# Patient Record
Sex: FEMALE | Race: WHITE
[De-identification: names, ages, dates, MRNs, and addresses within clinical notes are randomized per-mention and may not be internally consistent; named-entity substitution may affect disease eponyms.]

---

## 2019-06-16 ENCOUNTER — HOSPITAL ENCOUNTER (INPATIENT)
Dept: HOSPITAL 75 - ER | Age: 74
LOS: 3 days | Discharge: HOME | DRG: 175 | End: 2019-06-19
Attending: INTERNAL MEDICINE | Admitting: INTERNAL MEDICINE
Payer: MEDICARE

## 2019-06-16 VITALS — WEIGHT: 198 LBS | HEIGHT: 66 IN | BODY MASS INDEX: 31.82 KG/M2

## 2019-06-16 VITALS — DIASTOLIC BLOOD PRESSURE: 60 MMHG | SYSTOLIC BLOOD PRESSURE: 103 MMHG

## 2019-06-16 VITALS — DIASTOLIC BLOOD PRESSURE: 87 MMHG | SYSTOLIC BLOOD PRESSURE: 125 MMHG

## 2019-06-16 VITALS — SYSTOLIC BLOOD PRESSURE: 106 MMHG | DIASTOLIC BLOOD PRESSURE: 78 MMHG

## 2019-06-16 VITALS — SYSTOLIC BLOOD PRESSURE: 115 MMHG | DIASTOLIC BLOOD PRESSURE: 61 MMHG

## 2019-06-16 VITALS — SYSTOLIC BLOOD PRESSURE: 108 MMHG | DIASTOLIC BLOOD PRESSURE: 88 MMHG

## 2019-06-16 DIAGNOSIS — R42: ICD-10-CM

## 2019-06-16 DIAGNOSIS — G43.909: ICD-10-CM

## 2019-06-16 DIAGNOSIS — R11.0: ICD-10-CM

## 2019-06-16 DIAGNOSIS — M54.9: ICD-10-CM

## 2019-06-16 DIAGNOSIS — I21.A1: ICD-10-CM

## 2019-06-16 DIAGNOSIS — R63.0: ICD-10-CM
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
## 2019-06-16 DIAGNOSIS — E66.9: ICD-10-CM

## 2019-06-16 DIAGNOSIS — R26.81: ICD-10-CM

## 2019-06-16 DIAGNOSIS — K21.9: ICD-10-CM

## 2019-06-16 DIAGNOSIS — I10: ICD-10-CM

## 2019-06-16 DIAGNOSIS — H53.2: ICD-10-CM

## 2019-06-16 DIAGNOSIS — A94: ICD-10-CM

## 2019-06-16 DIAGNOSIS — E87.6: ICD-10-CM

## 2019-06-16 DIAGNOSIS — Z91.81: ICD-10-CM

## 2019-06-16 DIAGNOSIS — M19.91: ICD-10-CM

## 2019-06-16 DIAGNOSIS — D61.818: ICD-10-CM

## 2019-06-16 DIAGNOSIS — T50.2X5A: ICD-10-CM

## 2019-06-16 DIAGNOSIS — R79.89: ICD-10-CM

## 2019-06-16 DIAGNOSIS — E83.42: ICD-10-CM

## 2019-06-16 DIAGNOSIS — I26.99: Primary | ICD-10-CM

## 2019-06-16 DIAGNOSIS — Z86.718: ICD-10-CM

## 2019-06-16 LAB
ALBUMIN SERPL-MCNC: 3.8 GM/DL (ref 3.2–4.5)
ALP SERPL-CCNC: 195 U/L (ref 40–136)
ALT SERPL-CCNC: 154 U/L (ref 0–55)
APTT BLD: 35 SEC (ref 24–35)
APTT PPP: YELLOW S
BACTERIA #/AREA URNS HPF: (no result) /HPF
BASOPHILS # BLD AUTO: 0 10^3/UL (ref 0–0.1)
BASOPHILS NFR BLD AUTO: 0 % (ref 0–10)
BILIRUB SERPL-MCNC: 1.9 MG/DL (ref 0.1–1)
BILIRUB UR QL STRIP: NEGATIVE
BUN/CREAT SERPL: 19
CALCIUM SERPL-MCNC: 8.9 MG/DL (ref 8.5–10.1)
CHLORIDE SERPL-SCNC: 97 MMOL/L (ref 98–107)
CO2 SERPL-SCNC: 22 MMOL/L (ref 21–32)
CREAT SERPL-MCNC: 0.78 MG/DL (ref 0.6–1.3)
D DIMER PPP FEU-MCNC: 15.84 UG/ML (ref 0–0.49)
EOSINOPHIL # BLD AUTO: 0 10^3/UL (ref 0–0.3)
EOSINOPHIL NFR BLD AUTO: 0 % (ref 0–10)
ERYTHROCYTE [DISTWIDTH] IN BLOOD BY AUTOMATED COUNT: 13.1 % (ref 10–14.5)
FIBRINOGEN PPP-MCNC: CLEAR MG/DL
GFR SERPLBLD BASED ON 1.73 SQ M-ARVRAT: > 60 ML/MIN
GLUCOSE SERPL-MCNC: 127 MG/DL (ref 70–105)
GLUCOSE UR STRIP-MCNC: NEGATIVE MG/DL
HCT VFR BLD CALC: 35 % (ref 35–52)
HGB BLD-MCNC: 12.1 G/DL (ref 11.5–16)
INR PPP: 1.1 (ref 0.8–1.4)
KETONES UR QL STRIP: (no result)
LEUKOCYTE ESTERASE UR QL STRIP: NEGATIVE
LYMPHOCYTES # BLD AUTO: 0.2 X 10^3 (ref 1–4)
LYMPHOCYTES NFR BLD AUTO: 9 % (ref 12–44)
MANUAL DIFFERENTIAL PERFORMED BLD QL: YES
MCH RBC QN AUTO: 30 PG (ref 25–34)
MCHC RBC AUTO-ENTMCNC: 35 G/DL (ref 32–36)
MCV RBC AUTO: 88 FL (ref 80–99)
MONOCYTES # BLD AUTO: 0.1 X 10^3 (ref 0–1)
MONOCYTES NFR BLD AUTO: 4 % (ref 0–12)
MONOCYTES NFR BLD: 5 %
NEUTROPHILS # BLD AUTO: 2 X 10^3 (ref 1.8–7.8)
NEUTROPHILS NFR BLD AUTO: 87 % (ref 42–75)
NEUTS BAND NFR BLD MANUAL: 88 %
NITRITE UR QL STRIP: NEGATIVE
PH UR STRIP: 7 [PH] (ref 5–9)
PLATELET # BLD: 120 10^3/UL (ref 130–400)
PMV BLD AUTO: 10.3 FL (ref 7.4–10.4)
POTASSIUM SERPL-SCNC: 3.1 MMOL/L (ref 3.6–5)
PROT SERPL-MCNC: 6.9 GM/DL (ref 6.4–8.2)
PROT UR QL STRIP: NEGATIVE
PROTHROMBIN TIME: 14.3 SEC (ref 12.2–14.7)
RBC #/AREA URNS HPF: (no result) /HPF
RBC MORPH BLD: NORMAL
SODIUM SERPL-SCNC: 133 MMOL/L (ref 135–145)
SP GR UR STRIP: 1 (ref 1.02–1.02)
SQUAMOUS #/AREA URNS HPF: (no result) /HPF
UROBILINOGEN UR-MCNC: NORMAL MG/DL
VARIANT LYMPHS NFR BLD MANUAL: 7 %
WBC # BLD AUTO: 2.3 10^3/UL (ref 4.3–11)
WBC #/AREA URNS HPF: (no result) /HPF

## 2019-06-16 PROCEDURE — 85379 FIBRIN DEGRADATION QUANT: CPT

## 2019-06-16 PROCEDURE — 85007 BL SMEAR W/DIFF WBC COUNT: CPT

## 2019-06-16 PROCEDURE — 71275 CT ANGIOGRAPHY CHEST: CPT

## 2019-06-16 PROCEDURE — 71045 X-RAY EXAM CHEST 1 VIEW: CPT

## 2019-06-16 PROCEDURE — 86757 RICKETTSIA ANTIBODY: CPT

## 2019-06-16 PROCEDURE — 86308 HETEROPHILE ANTIBODY SCREEN: CPT

## 2019-06-16 PROCEDURE — 93005 ELECTROCARDIOGRAM TRACING: CPT

## 2019-06-16 PROCEDURE — 86663 EPSTEIN-BARR ANTIBODY: CPT

## 2019-06-16 PROCEDURE — 86022 PLATELET ANTIBODIES: CPT

## 2019-06-16 PROCEDURE — 85730 THROMBOPLASTIN TIME PARTIAL: CPT

## 2019-06-16 PROCEDURE — 94761 N-INVAS EAR/PLS OXIMETRY MLT: CPT

## 2019-06-16 PROCEDURE — 86618 LYME DISEASE ANTIBODY: CPT

## 2019-06-16 PROCEDURE — 80048 BASIC METABOLIC PNL TOTAL CA: CPT

## 2019-06-16 PROCEDURE — 85027 COMPLETE CBC AUTOMATED: CPT

## 2019-06-16 PROCEDURE — 94664 DEMO&/EVAL PT USE INHALER: CPT

## 2019-06-16 PROCEDURE — 87040 BLOOD CULTURE FOR BACTERIA: CPT

## 2019-06-16 PROCEDURE — 36415 COLL VENOUS BLD VENIPUNCTURE: CPT

## 2019-06-16 PROCEDURE — 80053 COMPREHEN METABOLIC PANEL: CPT

## 2019-06-16 PROCEDURE — 96361 HYDRATE IV INFUSION ADD-ON: CPT

## 2019-06-16 PROCEDURE — 85025 COMPLETE CBC W/AUTO DIFF WBC: CPT

## 2019-06-16 PROCEDURE — 86668 FRANCISELLA TULARENSIS: CPT

## 2019-06-16 PROCEDURE — 85384 FIBRINOGEN ACTIVITY: CPT

## 2019-06-16 PROCEDURE — 86788 WEST NILE VIRUS AB IGM: CPT

## 2019-06-16 PROCEDURE — 84100 ASSAY OF PHOSPHORUS: CPT

## 2019-06-16 PROCEDURE — 93306 TTE W/DOPPLER COMPLETE: CPT

## 2019-06-16 PROCEDURE — 86664 EPSTEIN-BARR NUCLEAR ANTIGEN: CPT

## 2019-06-16 PROCEDURE — 93041 RHYTHM ECG TRACING: CPT

## 2019-06-16 PROCEDURE — 83605 ASSAY OF LACTIC ACID: CPT

## 2019-06-16 PROCEDURE — 96365 THER/PROPH/DIAG IV INF INIT: CPT

## 2019-06-16 PROCEDURE — 93970 EXTREMITY STUDY: CPT

## 2019-06-16 PROCEDURE — 86666 EHRLICHIA ANTIBODY: CPT

## 2019-06-16 PROCEDURE — 85610 PROTHROMBIN TIME: CPT

## 2019-06-16 PROCEDURE — 83735 ASSAY OF MAGNESIUM: CPT

## 2019-06-16 PROCEDURE — 80061 LIPID PANEL: CPT

## 2019-06-16 PROCEDURE — 86665 EPSTEIN-BARR CAPSID VCA: CPT

## 2019-06-16 PROCEDURE — 82962 GLUCOSE BLOOD TEST: CPT

## 2019-06-16 PROCEDURE — 85045 AUTOMATED RETICULOCYTE COUNT: CPT

## 2019-06-16 PROCEDURE — 80074 ACUTE HEPATITIS PANEL: CPT

## 2019-06-16 PROCEDURE — 81000 URINALYSIS NONAUTO W/SCOPE: CPT

## 2019-06-16 PROCEDURE — 70450 CT HEAD/BRAIN W/O DYE: CPT

## 2019-06-16 PROCEDURE — 86789 WEST NILE VIRUS ANTIBODY: CPT

## 2019-06-16 PROCEDURE — 84484 ASSAY OF TROPONIN QUANT: CPT

## 2019-06-16 RX ADMIN — ONDANSETRON PRN MG: 2 INJECTION, SOLUTION INTRAMUSCULAR; INTRAVENOUS at 18:02

## 2019-06-16 RX ADMIN — SODIUM CHLORIDE SCH MLS/HR: 900 INJECTION, SOLUTION INTRAVENOUS at 14:36

## 2019-06-16 RX ADMIN — SODIUM CHLORIDE SCH MLS/HR: 900 INJECTION, SOLUTION INTRAVENOUS at 17:28

## 2019-06-16 RX ADMIN — DOXYCYCLINE HYCLATE SCH MG: 100 TABLET, COATED ORAL at 15:42

## 2019-06-16 RX ADMIN — APIXABAN SCH MG: 5 TABLET, FILM COATED ORAL at 15:42

## 2019-06-16 NOTE — NUR
JAMINDUNG HUBBARD admitted to room CU3-1, with an admitting diagnosis of TICK BITE DISEASE, on 
06/16/19 from ER via W/C, accompanied by STAFF.JAMIN HUBBARD introduced to surroundings, 
call light, bed controls, phone, TV, temperature control, lights, meal times, smoking 
policy, visitor policy, side rail policy, bathrooms and showers.  Patient Rights given to 
patient in the handbook.JAMIN HUBBARD verbalizes understanding that [facility] is not 
responsible for the loss or damage to any personal effects or valuables that are kept in the 
patients posession during their hospitalization.  The following Patient Care Plans were 
discussed with the PT AND : Discharge Planning, PAIN CONTROL,IV THERAPY, and TESTS 
AND PROCEDURES. JAMIN HUBBARD verbalizes understanding of Interdisciplinary Patient 
Education. Patient and/or family were informed about the Rapid Response Team and its 
purpose.

## 2019-06-16 NOTE — NUR
PT ASSISTED BACK TO BED AT THIS TIME. PT REPORTS EPISODE OF DIAHRREA AND 
CONTAMINATED URINE SPECIMEN IS SUSPECTED.

## 2019-06-16 NOTE — XMS REPORT
Continuity of Care Document

                             Created on: 2019



Sonja Huston

External Reference #: [id number]

: 1945

Sex: Female



Demographics







                          Address                   [address] [place] [address]  [id number]

 

                          Home Phone                [phone] x

 

                          Preferred Language        Unknown

 

                          Marital Status            Unknown

 

                          Oriental orthodox Affiliation     Unknown

 

                          Race                      Unknown

 

                          Ethnic Group              Unknown





Author







                          Organization              Unknown

 

                          Address                   Unknown

 

                          Phone                     [phone]



              



Allergies

      





             Active              Description              Code              Type              Severity

                Reaction              Onset              Reported/Identified              Relationship

 to Patient                             Clinical Status        

 

                Yes              NKANo Known Allergies              NKA              Miscellaneous Allergy

              Mild              N/A                             2009                 

                                                 



                  



Medications

      



There is no data.                  



Problems

      





             Date Dx Coded              Attending              Type              Code              Diagnosis

                                        Diagnosed By        

 

                2015              ELINA GRAY, DIANELYS MANNING              Ot              562.10      

                                                             

 

                2015              ELINA GRAY, DIANELYS MANNING              Ot              V12.72      

                                                             

 

                2015              ELINA GRAY, DIANELYS MANNING              Ot              V67.09      

                                                             

 

                2015              ELINA GRAY, DIANELYS MANNING              Ot              V72.84      

                                                             

 

                2015              ELINA GRAY, DIANELYS MANNING              Ot              V72.84      

                                                             

 

                2015              ELINA GRAY, DIANELYS MANNING              Ot              V72.84      

                                                             



                            



Procedures

      



There is no data.                  



Results

      





                    Test                Result              Range        









                                        Complete blood count (CBC) with automated white blood cell (WBC) differential - 

19 07:17         









                          Blood leukocytes automated count (number/volume)              2.3 10*3/uL         

                                        4.3-11.0        

 

                          Blood erythrocytes automated count (number/volume)              3.99 10*6/uL      

                                        4.35-5.85        

 

                          Venous blood hemoglobin measurement (mass/volume)              12.1 g/dL          

                                        11.5-16.0        

 

                    Blood hematocrit (volume fraction)              35 %                35-52        

 

                    Automated erythrocyte mean corpuscular volume              88 [foz_us]              

80-99        

 

                                        Automated erythrocyte mean corpuscular hemoglobin (mass per erythrocyte)        

                          30 pg                     25-34        

 

                                        Automated erythrocyte mean corpuscular hemoglobin concentration measurement (mass/volume)

                          35 g/dL                   32-36        

 

                    Automated erythrocyte distribution width ratio              13.1 %              10.0-

14.5        

 

                    Automated blood platelet count (count/volume)              120 10*3/uL              

130-400        

 

                          Automated blood platelet mean volume measurement              10.3 [foz_us]       

                                        7.4-10.4        

 

                    Automated blood neutrophils/100 leukocytes              87 %                42-75     

   

 

                    Automated blood lymphocytes/100 leukocytes              9 %                 12-44      

  

 

                    Blood monocytes/100 leukocytes              4 %                 0-12        

 

                    Automated blood eosinophils/100 leukocytes              0 %                 0-10       

 

 

                    Automated blood basophils/100 leukocytes              0 %                 0-10        

 

                          Blood neutrophils automated count (number/volume)              2.0 10*3           

                                        1.8-7.8        

 

                          Blood lymphocytes automated count (number/volume)              0.2 10*3           

                                        1.0-4.0        

 

                    Blood monocytes automated count (number/volume)              0.1 10*3              0.0-

1.0        

 

                    Automated eosinophil count              0.0 10*3/uL              0.0-0.3        

 

                    Automated blood basophil count (count/volume)              0.0 10*3/uL              

0.0-0.1        









                                        PT panel in platelet poor plasma by coagulation assay - 19 07:17         









                          Prothrombin time (PT) in platelet poor plasma by coagulation assay              14.3

 s                                      12.2-14.7        

 

                          INR in platelet poor plasma or blood by coagulation assay              1.1        

                                        0.8-1.4        









                                        Activated partial thromboplastin time (aPTT) in platelet poor plasma bycoagulation

 assay - 19 07:17         









                                        Activated partial thromboplastin time (aPTT) in platelet poor plasma bycoagulation

 assay                    35 s                      24-35        









                                        Comprehensive metabolic panel - 19 07:17         









                          Serum or plasma sodium measurement (moles/volume)              133 mmol/L         

                                        135-145        

 

                          Serum or plasma potassium measurement (moles/volume)              3.1 mmol/L      

                                        3.6-5.0        

 

                          Serum or plasma chloride measurement (moles/volume)              97 mmol/L        

                                                

 

                    Carbon dioxide              22 mmol/L              21-32        

 

                          Serum or plasma anion gap determination (moles/volume)              14 mmol/L     

                                        5-14        

 

                          Serum or plasma urea nitrogen measurement (mass/volume)              15 mg/dL     

                                        7-18        

 

                          Serum or plasma creatinine measurement (mass/volume)              0.78 mg/dL      

                                        0.60-1.30        

 

                    Serum or plasma urea nitrogen/creatinine mass ratio              19                  NRG

        

 

                                        Serum or plasma creatinine measurement with calculation of estimated glomerular 

filtration rate              >                         NRG        

 

                          Serum or plasma glucose measurement (mass/volume)              127 mg/dL          

                                                

 

                          Serum or plasma calcium measurement (mass/volume)              8.9 mg/dL          

                                        8.5-10.1        

 

                          Serum or plasma total bilirubin measurement (mass/volume)              1.9 mg/dL  

                                        0.1-1.0        

 

                                        Serum or plasma alkaline phosphatase measurement (enzymatic activity/volume)    

                          195 U/L                           

 

                                        Serum or plasma aspartate aminotransferase measurement (enzymatic activity/volume)

                          190 U/L                   5-34        

 

                                        Serum or plasma alanine aminotransferase measurement (enzymatic activity/volume)

                          154 U/L                   0-55        

 

                          Serum or plasma protein measurement (mass/volume)              6.9 g/dL           

                                        6.4-8.2        

 

                          Serum or plasma albumin measurement (mass/volume)              3.8 g/dL           

                                        3.2-4.5        

 

                    CALCIUM CORRECTED              9.1 mg/dL              8.5-10.1        









                                        Serum or plasma troponin i.cardiac measurement (mass/volume) - 19 07:17   

      









                          Serum or plasma troponin i.cardiac measurement (mass/volume)              0.067 ng/mL

                                        <0.028        









                                        Manual absolute plasma cell count - 19 07:17         









                    Blood monocytes/100 leukocytes              5 %                 NRG        

 

                    Manual blood segmented neutrophils/100 leukocytes              88 %                NRG

        

 

                    Manual blood lymphocytes/100 leukocytes              7 %                 NRG        

 

                          Blood erythrocyte morphology finding identification              NORMAL           

                                        NRG        









                                        Fibrin D-dimer FEU measurement in platelet poor plasma (mass/volume) - 19 

07:17         









                          Fibrin D-dimer FEU measurement in platelet poor plasma (mass/volume)              

15.84 ug/mL                             0.00-0.49        









                                        Capillary blood glucose measurement by glucometer (mass/volume) - 19 07:32

         









                          Capillary blood glucose measurement by glucometer (mass/volume)              126 mg/dL

                                                









                                        Blood lactic acid measurement (moles/volume) - 19 07:37         









                    Blood lactic acid measurement (moles/volume)              0.69 mmol/L              0.50-

2.00        



                                



Encounters

      





                ACCT No.              Visit Date/Time              Discharge              Status      

                Pt. Type              Provider              Facility              Loc./Unit      

                                        Complaint        

 

                    7/29/10              2019 00:15:41              2019 23:59:59           

                [facility]              Outpatient              Pratima Ni                       

                                                             

 

                    [id number]              2015 06:51:00              2015 09:30:00      

                [facility]              Outpatient              DIANELYS ANTOINE MD              [facility]                                

 

                    [id number]              2015 05:54:00              2015 23:59:59      

                [facility]              Outpatient              DIANELYS ANTOINE MD              [facility]              PREOP                              

 

                    [id number]              07/10/2014 13:53:00              07/10/2014 15:31:00      

           [facility]              Emergency                                                           

         

 

                    [id number]              2014 17:31:00              2014 23:59:59      

             [facility]              Outpatient                                                        

                                                 

 

                [id number]              2019 07:39:00                                          

             Document Registration

## 2019-06-16 NOTE — HISTORY & PHYSICAL-HOSPITALIST
History of Present Illness


HPI/Chief Complaint


Chief complaint: Fever with hypoxia





History present illness: This is a patient of Dr. Ni who has a past medical

history of hypertension who presented to the ER with worsening weakness and 

shortness of breath.  Work-up included multiple test revealing leukopenia, 

thrombocytopenia, fever of 102 with elevated d-dimer so CT scan was obtained 

showing bilateral pulmonary emboli.  Cardiology and pulmonology were consulted 

patient was placed on oral anticoagulation and in the cardiac stepdown unit.  

Currently patient denies any chest pain shortness of breath and denies any other

significant complaints.  She had a tick bite that have been in the back of her 

scalp for at least 4 days before found in dog out by her .  She has been 

placed on empiric antibiotic of doxycycline in the meantime and tickborne titers

are pending.


Source:  patient, RN/MD


Exam Limitations:  no limitations


Date Seen


6/16/19


Time Seen by a Provider:  11:00


Attending Physician


Sydni Singer DO


PCP


Pratima Ni DO


Referring Physician





Date of Admission


Jun 16, 2019 at 08:40





Home Medications & Allergies


Home Medications


Reviewed patient Home Medication Reconciliation performed by pharmacy medication

reconciliations technician and/or nursing.


Patients Allergies have been reviewed.





Allergies





Allergies


Coded Allergies


  NKANo Known Allergies (Unverified Allergy, Mild, 6/1/09)








Past Medical-Social-Family Hx


Past Med/Social Hx:  Reviewed Nursing Past Med/Soc Hx, Reviewed and Corrections 

made


Patient Social History


Marrital Status:  


Employed/Student:  retired (homemaker)


Alcohol Use:  Denies Use


Recreational Drug Use:  No


Smoking Status:  Never a Smoker


Physical Abuse Screen:  No


Sexual Abuse:  No


Recent Foreign Travel:  No


Contact w/other who traveled:  No


Recent Infectious Disease Expo:  No





Immunizations Up To Date


Date of Pneumonia Vaccine:  Nov 1, 2017


Date of Influenza Vaccine:  Sep 13, 2014





Seasonal Allergies


Seasonal Allergies:  No





Past Medical History


Surgeries:  Orthopedic


Cardiac:  Deep Vein Thrombosis, Hypertension


Neurological:  Headaches /Migraines


Gastrointestinal:  Gastroesophageal Reflux


Musculoskeletal:  Arthritis, Chronic Back Pain


History of Blood Disorders:  No





Review of Systems


Constitutional:  see HPI, dizziness, fever, malaise, weakness


Respiratory:  dyspnea on exertion





Physical Exam


Physical Exam


Vital Signs





Vital Signs - First Documented








 6/16/19 6/16/19





 07:24 07:30


 


Temp 101.5 


 


Pulse 107 


 


Resp 20 


 


B/P (MAP) 116/95 (102) 


 


Pulse Ox 94 


 


O2 Delivery  Nasal Cannula


 


O2 Flow Rate  2.00





Capillary Refill : Less Than 3 Seconds


Height, Weight, BMI


Height: 5'6.00"


Weight: 198lbs. 0.0oz. [id number]; 32.0 BMI


Method:Stated


General Appearance:  No Apparent Distress, WD/WN, Chronically ill, Obese


Eyes:  Right Eye Normal Inspection, Right Eye PERRL


HEENT:  PERRL/EOMI, Normal ENT Inspection, Pharynx Normal, Moist Mucous 

Membranes


Neck:  Full Range of Motion, Normal Inspection, Non Tender


Respiratory:  Chest Non Tender, Lungs Clear, Normal Breath Sounds, No Accessory 

Muscle Use, No Respiratory Distress


Cardiovascular:  Regular Rate, Rhythm, No Edema, No Gallop, No JVD, No Murmur, 

Normal Peripheral Pulses


Gastrointestinal:  Normal Bowel Sounds, No Organomegaly, No Pulsatile Mass, Non 

Tender, Soft


Back:  Normal Inspection, No CVA Tenderness, No Vertebral Tenderness


Extremity:  Normal Capillary Refill, Normal Inspection, Normal Range of Motion, 

Non Tender, No Calf Tenderness, No Pedal Edema


Neurologic/Psychiatric:  Alert, Oriented x3, No Motor/Sensory Deficits, Normal 

Mood/Affect


Skin:  Normal Color, Warm/Dry


Lymphatic:  No Adenopathy





Results


Results/Procedures


Labs


Laboratory Tests


6/16/19 07:17








Patient resulted labs reviewed.





Assessment/Plan


Admission Diagnosis


Assessment:


Bilateral pulmonary embolism placed on Eliquis


Fever placed on abx empirically


Recent tick bite on the back of the neck placed on empiric Doxycycline and tick 

borne titers drawn


HTN


Leukopenia with thrombocytopenia suspicious for tick borne illness


Hypokalemia


Subtle increase in troponin


Elevated LFT suspect tick born illness





Plan:


OAC


Potassium


O2


Doxycycline empirically


Monitor liver enzymes


Admission Status:  Inpatient Order (span 2 midnights)


Reason for Inpatient Admission:  


Fever with bilateral PE's





Diagnosis/Problems


Diagnosis/Problems





(1) Pulmonary embolism, bilateral


Status:  Acute


(2) Liver enzyme elevation


Status:  Acute


(3) Leukopenia


Status:  Acute


Qualifiers:  


   Leukopenia type:  unspecified  Qualified Codes:  D72.819 - Decreased white 

blood cell count, unspecified


(4) Thrombocytopenia


Status:  Acute


(5) Hypokalemia


Status:  Acute


(6) Tick-borne disease


Status:  Acute


(7) Hypoxia


Status:  Acute


(8) Dizziness


Status:  Acute


(9) Elevated troponin


Status:  Acute


(10) D-dimer, elevated


Status:  Acute





Clinical Quality Measures


DVT/VTE Risk/Contraindication:


Risk Factor Score Per Nursing:  3


RFS Level Per Nursing on Admit:  3=High











SYDNI SINGER DO                Jun 16, 2019 15:13

## 2019-06-16 NOTE — ED NEUROLOGICAL PROBLEM
General


Chief Complaint:  Dizziness/Syncope


Stated Complaint:  DIZZY,FALLING,NOT EATING


Source:  patient, family


Exam Limitations:  no limitations





History of Present Illness


Date Seen by Provider:  Jun 16, 2019


Time Seen by Provider:  07:35


Initial Comments


This 74-year-old white female presents with a history of headache and dizziness.

 4 days ago late in the afternoon on Thursday the patient developed a severe 

headache.  Although the patient suffers from migraines this headache felt 

different to her.  3 days later on Saturday the patient became dizzy and had 

self-limited double vision.  The dizziness and unsteady gait have continued.  

This has caused the patient to fall twice without injuring herself.  There has 

been no lateralizing or localizing persistent neurologic complaint.





The patient has had persistent nausea and dry heaves.





The patient has had a recent tick bite to the cervical area.  The tick was 

removed.  The patient has developed no significant rash or localized significant

erythema to the tick bite area located at the back of her neck.





Patient has a history of hypertension.  She is on lisinopril and 

hydrochlorothiazide.  She has not taken her blood pressure medicine for several 

weeks due to a cough (ace inhibitor?).





Patient is under the care of Dr. BUCK.





Allergies and Home Medications


Allergies


Coded Allergies:  


     TARAANo Known Allergies (Unverified  Allergy, Mild, 6/1/09)





Home Medications


Gabapentin 300 Mg Capsule, 600 MG PO DAILY, (Reported)


[Celebrex]  , 200 MG PO DAILY, (Reported)





Patient Home Medication List


Home Medication List Reviewed:  Yes





Review of Systems


Review of Systems


Constitutional:  chills; No fever


Eyes:  See HPI; Denies Blindness; Other


Ears, Nose, Mouth, Throat:  denies ear pain, denies ear discharge


Respiratory:  cough (recent cough, questionably from ACE inhibitor.)


Cardiovascular:  No chest pain, No palpitations


Gastrointestinal:  No constipation, No diarrhea; loss of appetite, nausea, 

vomiting


Genitourinary:  No dysuria, No frequency


Pregnant:  No


Musculoskeletal:  No back pain


Skin:  No change in color, No rash; other (tick bite to the upper neck area 

posteriorly)


Psychiatric/Neurological:  Denies Cognitive Dysfunction; Headache


Endocrine:  No Symptoms Reported


Hematologic/Lymphatic:  No Symptoms Reported





Past Medical-Social-Family Hx


Past Med/Social Hx:  Reviewed Nursing Past Med/Soc Hx


Patient Social History


Recent Foreign Travel:  No


Contact w/Someone Who Travel:  No





Immunizations Up To Date


Date of Pneumonia Vaccine:  Jan 13, 2012


Date of Influenza Vaccine:  Sep 13, 2014





Past Medical History


Arthritis





Physical Exam


Vital Signs





Vital Signs - First Documented








 6/16/19 6/16/19





 07:24 07:30


 


Temp 101.5 


 


Pulse 107 


 


Resp 20 


 


B/P (MAP) 116/95 (102) 


 


Pulse Ox 94 


 


O2 Delivery  Nasal Cannula


 


O2 Flow Rate  2.00





Capillary Refill :


Height, Weight, BMI


Height: 5'6.00"


Weight: 190lbs. oz. 86.080686qc;  BMI


Method:


General Appearance:  WD/WN, no apparent distress


HEENT:  normal ENT inspection


Neck:  non-tender, full range of motion, supple


Respiratory:  chest non-tender, lungs clear, normal breath sounds


Cardiovascular:  normal peripheral pulses, regular rate, rhythm


Gastrointestinal:  normal bowel sounds, non tender, soft


Back:  normal inspection, no vertebral tenderness


Extremities:  normal range of motion, non-tender, normal inspection


Neurologic/Psychiatric:  no motor/sensory deficits, alert, normal mood/affect, 

oriented x 3


Crainal Nerves:  normal hearing, normal speech, PERRL; No facial asymmetry, No 

tongue deviation to R, No tongue deviation to L


Coordination/Gait:  normal gait


Skin:  normal color, warm/dry, other (there is a 1 cm area of slight erythema 

over the superior aspect of the cervical region where the tick was removed.)





Focused Exam


Lactate Level


6/16/19 07:37: Lactic Acid Level 0.69





Lactic Acid Level





Laboratory Tests








Test


 6/16/19


07:37


 


Lactic Acid Level


 0.69 MMOL/L


(0.50-2.00)











Progress/Results/Core Measures


Results/Orders


Lab Results





Laboratory Tests








Test


 6/16/19


07:17 6/16/19


07:29 6/16/19


07:32 6/16/19


07:37 Range/Units


 


 


White Blood Count


 2.3 L


 


 


 


 4.3-11.0


10^3/uL


 


Red Blood Count


 3.99 L


 


 


 


 4.35-5.85


10^6/uL


 


Hemoglobin 12.1     11.5-16.0  G/DL


 


Hematocrit 35     35-52  %


 


Mean Corpuscular Volume 88     80-99  FL


 


Mean Corpuscular Hemoglobin 30     25-34  PG


 


Mean Corpuscular Hemoglobin


Concent 35 


 


 


 


 32-36  G/DL





 


Red Cell Distribution Width 13.1     10.0-14.5  %


 


Platelet Count


 120 L


 


 


 


 130-400


10^3/uL


 


Mean Platelet Volume 10.3     7.4-10.4  FL


 


Neutrophils (%) (Auto) 87 H    42-75  %


 


Lymphocytes (%) (Auto) 9 L    12-44  %


 


Monocytes (%) (Auto) 4     0-12  %


 


Eosinophils (%) (Auto) 0     0-10  %


 


Basophils (%) (Auto) 0     0-10  %


 


Neutrophils # (Auto) 2.0     1.8-7.8  X 10^3


 


Lymphocytes # (Auto) 0.2 L    1.0-4.0  X 10^3


 


Monocytes # (Auto) 0.1     0.0-1.0  X 10^3


 


Eosinophils # (Auto)


 0.0 


 


 


 


 0.0-0.3


10^3/uL


 


Basophils # (Auto)


 0.0 


 


 


 


 0.0-0.1


10^3/uL


 


Neutrophils % (Manual) 88      %


 


Lymphocytes % (Manual) 7      %


 


Monocytes % (Manual) 5      %


 


Blood Morphology Comment NORMAL      


 


Prothrombin Time 14.3     12.2-14.7  SEC


 


INR Comment 1.1     0.8-1.4  


 


Activated Partial


Thromboplast Time 35 


 


 


 


 24-35  SEC





 


D-Dimer


 15.84 H


 


 


 


 0.00-0.49


UG/ML


 


Sodium Level 133 L    135-145  MMOL/L


 


Potassium Level 3.1 L    3.6-5.0  MMOL/L


 


Chloride Level 97 L      MMOL/L


 


Carbon Dioxide Level 22     21-32  MMOL/L


 


Anion Gap 14     5-14  MMOL/L


 


Blood Urea Nitrogen 15     7-18  MG/DL


 


Creatinine


 0.78 


 


 


 


 0.60-1.30


MG/DL


 


Estimat Glomerular Filtration


Rate > 60 


 


 


 


  





 


BUN/Creatinine Ratio 19      


 


Glucose Level 127 H      MG/DL


 


Calcium Level 8.9     8.5-10.1  MG/DL


 


Corrected Calcium 9.1     8.5-10.1  MG/DL


 


Total Bilirubin 1.9 H    0.1-1.0  MG/DL


 


Aspartate Amino Transf


(AST/SGOT) 190 H


 


 


 


 5-34  U/L





 


Alanine Aminotransferase


(ALT/SGPT) 154 H


 


 


 


 0-55  U/L





 


Alkaline Phosphatase 195 H      U/L


 


Troponin I 0.067 H    <0.028  NG/ML


 


Total Protein 6.9     6.4-8.2  GM/DL


 


Albumin 3.8     3.2-4.5  GM/DL


 


Glucometer   126 H    MG/DL


 


Lactic Acid Level


 


 


 


 0.69 


 0.50-2.00


MMOL/L








My Orders





Orders - FRANCY COUGHLIN MD


Cbc With Automated Diff (6/16/19 07:28)


Protime With Inr (6/16/19 07:28)


Partial Thromboplastin Time (6/16/19 07:28)


Comprehensive Metabolic Panel (6/16/19 07:28)


Fibrin Degradation Products (6/16/19 07:28)


Troponin I (6/16/19 07:28)


Ua Culture If Indicated (6/16/19 07:28)


Chest 1 View, Ap/Pa Only (6/16/19 07:28)


Ekg Tracing (6/16/19 07:28)


Nothing By Mouth (6/16/19 Lunch)


Ed Iv/Invasive Line Start (6/16/19 07:28)


Ed Iv/Invasive Line Start (6/16/19 07:28)


Vital Signs Stroke Patient Q15M (6/16/19 07:28)


Ct Head Wo-R/O Stroke (6/16/19 07:28)


O2 (6/16/19 07:28)


Intake & Output 06,14,22 (6/16/19 07:28)


Monitor-Rhythm Ecg Trace Only (6/16/19 07:28)


Dysphagia Screening Tool (6/16/19 07:28)


Post Thrombolytic Adminstratio (6/16/19 07:28)


Lipid Panel (6/17/19 06:00)


Accucheck Stat ONCE (6/16/19 07:31)


Ns Iv 1000 Ml (Sodium Chloride 0.9%) (6/16/19 07:45)


Blood Culture (6/16/19 07:36)


Tick Panel With Lyme Eia (6/16/19 07:36)


Lactic Acid Analyzer (6/16/19 07:36)


Manual Differential (6/16/19 07:17)


Doxycycline Injection (Vibramycin Inject (6/16/19 08:00)


Acetaminophen Tablet/Caplet (Tylenol  T (6/16/19 08:30)


Ct Angio Chest W (6/16/19 08:38)


Iohexol Injection (Omnipaque 350 Mg/Ml 1 (6/16/19 08:45)


Received Contrast (Hold Metformin- Contr (6/16/19 08:45)


Ns (Ivpb) (Sodium Chloride 0.9% Ivpb Bag (6/16/19 08:45)


Metoprolol Tartrate (Ir) Tab (Lopressor (6/16/19 09:00)





Medications Given in ED





Current Medications








 Medications  Dose


 Ordered  Sig/Beatriz


 Route  Start Time


 Stop Time Status Last Admin


Dose Admin


 


 Doxycycline


 Hyclate 100 mg/


 Sodium Chloride  100 ml @ 


 100 mls/hr  ONCE  ONCE


 IV  6/16/19 08:00


 6/16/19 08:59  6/16/19 08:14


100 MLS/HR








Vital Signs/I&O











 6/16/19 6/16/19





 07:24 07:30


 


Temp 101.5 


 


Pulse 107 


 


Resp 20 


 


B/P (MAP) 116/95 (102) 


 


Pulse Ox 94 94


 


O2 Delivery  Nasal Cannula


 


O2 Flow Rate  2.00











Progress


Progress Note :  


   Time:  08:16


Progress Note


The patient's bedside glucose was 120.  The NIHSS which I approximated at the 

bedside was 0.  Patient received 100 mg of doxycycline IV.  With the patient's 

fever and neurologic complaints I think given her recent tick bite this has to 

be a first-line consideration.





850 a.m.





The patient's d-dimer was markedly elevated at 15.  The patient is getting PE 

study.  The patient's troponin was mildly elevated.





Dr. Singer asked that I consult Dr. Travis and Dr. Siddiqui.





Patient's receiving aspirin and Lopressor.  I treated her fever with Tylenol 

orally.  The patient's admitted to cardiac stepdown.





At this juncture with the fever, headache, dizziness, and recent tick bite I 

believe treated with doxycycline is still a.m. good option.  The hypoxia and 

elevated d-dimer is certainly concerning for PE.  Hopefully the minimally 

elevated troponin is not indicative of coronary disease given her normal EKG and

 lack of symptoms.





Departure


Communication (Admissions)


Time/Spoke to Admitting Phy:  08:54


Dr. Singer.


Time/Spoke to Consulting Phy:  08:54


Dr. Astudillo and Dr. Travis.





Impression





   Primary Impression:  


   Dizziness


   Additional Impressions:  


   Tick-borne disease


   Hypoxia


   D-dimer, elevated


   Elevated troponin


Disposition:  09 ADMITTED AS INPATIENT


Condition:  Improved





Admissions


Decision to Admit Reason:  Admit from ER (General)


Decision to Admit/Date:  Jun 16, 2019


Time/Decision to Admit Time:  08:56





Departure-Patient Inst.


Referrals:  


MEL BUCK DO (PCP/Family)


Primary Care Physician











FRANCY COUGHLIN MD             Jun 16, 2019 07:35

## 2019-06-16 NOTE — DIAGNOSTIC IMAGING REPORT
PROCEDURE: CT angiography of the chest with contrast.



TECHNIQUE: Multiple contiguous axial images were obtained through

the chest after uneventful bolus administration of intravenous

contrast. 2D reconstructed CTA MIP acquisitions were also

performed.

Auto Exposure Controls were utilized during the CT exam to meet

ALARA standards for radiation dose reduction. 



INDICATION:  Hypoxic. Elevated d-dimer



The lungs are clear. There is no effusion or pneumothorax. There

is no mediastinal mass or hemorrhage. There is no aortic aneurysm

or dissection. There is a hiatal hernia.



There are pulmonary emboli involving the second and third order

vessels in the left lower lobe. This involves just a single

second order vessel and several third order vessels and are

predominantly partially occluding. No left upper lobe embolus is

seen. There is an embolus in a second order vessel in the right

upper lobe which is predominantly including with involvement of

some third order vessels. There is a small nonoccluding thrombus

in the right lower lobe vessel. The heart size is not enlarged.



IMPRESSION: There are bilateral pulmonary emboli present.



Dictated by: 



  Dictated on workstation # [id number]

## 2019-06-16 NOTE — DIAGNOSTIC IMAGING REPORT
PROCEDURE: CT head wo r/o stroke.



TECHNIQUE: Multiple contiguous axial images were obtained through

the brain without the use of intravenous contrast. Auto Exposure

Controls were utilized during the CT exam to meet ALARA standards

for radiation dose reduction. 



INDICATION: Dizziness, not eating



FINDINGS:



There is no hemorrhage, hydrocephalus, edema, mass or mass

effect. No evidence for elevated pressures. The basilar cisterns

are patent. No sulcal effacement.



IMPRESSION:



No acute appearing abnormality.



Dictated by: 



  Dictated on workstation # [id number]

## 2019-06-16 NOTE — XMS REPORT
Continuity of Care Document

                             Created on: 2019



Sonja Huston

External Reference #: [id number]

: 1945

Sex: Female



Demographics







                          Address                   [address] [place] [address]  [id number]

 

                          Home Phone                [phone] x

 

                          Preferred Language        Unknown

 

                          Marital Status            Unknown

 

                          L[employer]an Affiliation     Unknown

 

                          Race                      Unknown

 

                          Ethnic Group              Unknown





Author







                          Organization              Unknown

 

                          Address                   Unknown

 

                          Phone                     [phone]



              



Allergies

      





             Active              Description              Code              Type              Severity

                Reaction              Onset              Reported/Identified              Relationship

 to Patient                             Clinical Status        

 

                Yes              NKANo Known Allergies              NKA              Miscellaneous Allergy

              Mild              N/A                             2009                 

                                                 



                  



Medications

      



There is no data.                  



Problems

      





             Date Dx Coded              Attending              Type              Code              Diagnosis

                                        Diagnosed By        

 

                2015              ELINA GRAY, DIANELYS MANNING              Ot              562.10      

                                                             

 

                2015              ELINA GRAY, DIANELYS MANNING              Ot              V12.72      

                                                             

 

                2015              ELINA GRAY, DIANELYS MANNING              Ot              V67.09      

                                                             

 

                2015              ELINA GRAY, DIANELYS MANNING              Ot              V72.84      

                                                             

 

                2015              ELINA GRAY, DIANELYS MANNING              Ot              V72.84      

                                                             

 

                2015              ELINA GRAY, DIANELYS MANNING              Ot              V72.84      

                                                             



                            



Procedures

      



There is no data.                  



Results

      



There is no data.              



Encounters

      





                ACCT No.              Visit Date/Time              Discharge              Status      

                Pt. Type              Provider              Facility              Loc./Unit      

                                        Complaint        

 

                    7/29/10              2019 00:15:41              2019 23:59:59           

                [facility]              Outpatient              Pratima Ni                       

                                                             

 

                    [id number]              2015 06:51:00              2015 09:30:00      

                [facility]              Outpatient              DIANELYS ANTOINE MD              [facility]                                

 

                    [id number]              2015 05:54:00              2015 23:59:59      

                [facility]              Outpatient              DIANELYS ANTOINE MD              [facility]              PREOP                              

 

                    [id number]              07/10/2014 13:53:00              07/10/2014 15:31:00      

           [facility]              Emergency                                                           

         

 

                    [id number]              2014 17:31:00              2014 23:59:59      

             [facility]              Outpatient

## 2019-06-16 NOTE — DIAGNOSTIC IMAGING REPORT
Indication: Dizziness. Falls.



A single view of the chest shows normal heart size and

vascularity. The lungs are clear. There is no effusion or

pneumothorax. There is no bony abnormality. 



Impression: Normal chest.



Dictated by: 



  Dictated on workstation # [id number]

## 2019-06-16 NOTE — CONSULTATION-CARDIOLOGY
HPI-Cardiology


Cardiology Consultation:


Date of Consultation


6/16/19


Time Seen by a Provider:  11:50


Date of Admission


6/16/19


Attending Physician


Sydni Singer DO


Admitting Physician


Pratima Ni DO


Consulting Physician


ALYSSA VOSS MD, MA, FACP, FACC, FSCAI, CCDS





Physician requesting consult: Dr Singer





HPI:


Chief Complaint:


Reason for consultation: Elevated troponin





HPI:


75 yo woman with gen malaise and headaches for about 24 hours and who awoke with

a feeling of dizziness and poor balance and some shortness of breath today. No 

cp or palp or syncope.  Denies focal weakness or seizures. Does not report 

recent leg swelling. Reports h/o recent tick bite to the back of the neck.


Had fever and hypoxia at time of presentation to the ER this am.





Review of Systems-Cardiology


Review of Systems


Constitutional:  As described under HPI


Eyes:  other (Has had intermittent blurriness of vision)


Ears/Nose/Throat:  No ear discharge, No nasal drainage, No recent hearing loss


Respiratory:  As described under HPI


Cardiovascular:  As described under HPI


Gastrointestinal:  No diarrhea, No nausea, No vomiting


Genitourinary:  No dysuria, No hematuria, No urine frequency changes


Pregnant:  No


Musculoskeletal:  back pain (chronic)


Skin:  No rash, No ulcerations


Psychiatric/Neurological:  As described under HPI; No focal weakness, No syncope


Hematologic:  No bleeding abnormalities





PMH-Social-Family Hx


Patient Social History


Alcohol Use:  Denies Use


Recreational Drug Use:  No


Smoking Status:  Never a Smoker


Recent Foreign Travel:  No


Recent Infectious Disease Expo:  No


Physical Abuse Screen:  No


Sexual Abuse:  No





Immunizations Up To Date


Date of Pneumonia Vaccine:  Nov 1, 2017


Date of Influenza Vaccine:  Sep 13, 2014





Past Medical History


PMH


As described under Assessment.





Family Medical History


Family Medical History:  


Does not report fam h/o early CAD or SCD





Allergies and Home Medications


Allergies


Coded Allergies:  


     NKANo Known Allergies (Unverified  Allergy, Mild, 6/1/09)





Home Medications


  , 40 MG PO DAILY, (Reported)


Gabapentin 300 Mg Capsule, 600 MG PO HS, (Reported)


Hydrochlorothiazide 12.5 Mg Tablet, 12.5 MG PO DAILY, (Reported)


Lisinopril 20 Mg Tablet, 20 MG PO DAILY, (Reported)


[Celebrex]  , 200 MG PO DAILY, (Reported)





Patient Home Medication List


Home Medication List Reviewed:  Yes





Physical Exam-Cardiology


Physical Exam


Vital Signs/I&O











 6/16/19 6/16/19 6/16/19 6/16/19





 07:24 07:30 09:41 10:13


 


Temp 101.5  99.7 99.7


 


Pulse 107  92 92


 


Resp 20  20 20


 


B/P (MAP) 116/95 (102)  115/61 (79) 115/61


 


Pulse Ox 94 94 94 94


 


O2 Delivery  Nasal Cannula Room Air Room Air


 


O2 Flow Rate  2.00  2.00





    2.00


 


    





 6/16/19   





 10:46   


 


O2 Delivery Nasal Cannula   


 


O2 Flow Rate 2.00   





Capillary Refill : Less Than 3 Seconds


Constitutional:  AAO x 3, well-developed, well-nourished


HEENT:  EOMI, hearing is well preserved; No xanthelasmas are seen


Neck:  carotid pulses are 2 + bilaterally, with good upstrokes


Respiratory:  No accessory muscle use; other (Fair bilat air entry, diminished 

at the bases)


Cardiovascular:  regular rate-rhythm, S1 and S2, systolic murmur (faint ABBY at 

card base)


Gastrointestinal:  No tender; soft; No guarding, No rebound; audible bowel 

sounds


Extremities:  No clubbing, No cyanosis, No significant edema


Neurologic/Psychiatric:  oriented x 3, other (able to move all limbs equally)


Skin:  No rash on exposed areas, No ulcerations on exposed areas





Data Review


Labs


Laboratory Tests


6/16/19 07:17: 


White Blood Count 2.3L, Red Blood Count 3.99L, Hemoglobin 12.1, Hematocrit 35, 

Mean Corpuscular Volume 88, Mean Corpuscular Hemoglobin 30, Mean Corpuscular 

Hemoglobin Concent 35, Red Cell Distribution Width 13.1, Platelet Count 120L, 

Mean Platelet Volume 10.3, Neutrophils (%) (Auto) 87H, Lymphocytes (%) (Auto) 9L

, Monocytes (%) (Auto) 4, Eosinophils (%) (Auto) 0, Basophils (%) (Auto) 0, 

Neutrophils # (Auto) 2.0, Lymphocytes # (Auto) 0.2L, Monocytes # (Auto) 0.1, 

Eosinophils # (Auto) 0.0, Basophils # (Auto) 0.0, Neutrophils % (Manual) 88, 

Lymphocytes % (Manual) 7, Monocytes % (Manual) 5, Blood Morphology Comment 

NORMAL, Prothrombin Time 14.3, INR Comment 1.1, Activated Partial Thromboplast 

Time 35, D-Dimer 15.84H, Sodium Level 133L, Potassium Level 3.1L, Chloride Level

 97L, Carbon Dioxide Level 22, Anion Gap 14, Blood Urea Nitrogen 15, Creatinine 

0.78, Estimat Glomerular Filtration Rate > 60, BUN/Creatinine Ratio 19, Glucose 

Level 127H, Calcium Level 8.9, Corrected Calcium 9.1, Total Bilirubin 1.9H, 

Aspartate Amino Transf (AST/SGOT) 190H, Alanine Aminotransferase (ALT/SGPT) 154H

, Alkaline Phosphatase 195H, Troponin I 0.067H, Total Protein 6.9, Albumin 3.8


6/16/19 07:29: 


6/16/19 07:32: Glucometer 126H


6/16/19 07:37: Lactic Acid Level 0.69


6/16/19 09:32: 


Urine Color YELLOW, Urine Clarity CLEAR, Urine pH 7, Urine Specific Gravity 

1.005L, Urine Protein NEGATIVE, Urine Glucose (UA) NEGATIVE, Urine Ketones 1+H, 

Urine Nitrite NEGATIVE, Urine Bilirubin NEGATIVE, Urine Urobilinogen NORMAL, 

Urine Leukocyte Esterase NEGATIVE, Urine RBC (Auto) NEGATIVE, Urine RBC NONE, 

Urine WBC NONE, Urine Squamous Epithelial Cells 0-2, Urine Crystals NONE, Urine 

Bacteria TRACE, Urine Casts NONE, Urine Mucus NEGATIVE, Urine Culture Indicated 

NO


6/16/19 11:55: Troponin I 0.066H





Laboratory Tests


6/16/19 07:17











A/P-Cardiology


Assessment/Admission Diagnosis





Bilateral pulmonary embolism





Fever, etiology undetermined





Recent tick bite on the back of the neck (June 2019)





Hypertension, by history





Mild leucopenia and thrombocytopenia, being managed by the Med Svce





Mild hypokalemia, likely due to chronic use of HCTZ





Mild troponin elevation: type 2 MI (due to hypoxia due to PE)





Discussion and Recomendations





* Treat with apixaban


* Management of possible tick-related illness and mild leucopenia and 

  thrombocytopenia is with Dr Singer


* D/c ASA and beta-blocker


* Hold diuretics


* Replenish K


* Monitor labs





Clinical Quality Measures


DVT/VTE Risk/Contraindication:


Risk Factor Score Per Nursing:  3


RFS Level Per Nursing on Admit:  3=High











ALYSSA VOSS MD FAC FAC CCDS   Jun 16, 2019 12:49

## 2019-06-17 VITALS — SYSTOLIC BLOOD PRESSURE: 99 MMHG | DIASTOLIC BLOOD PRESSURE: 54 MMHG

## 2019-06-17 VITALS — DIASTOLIC BLOOD PRESSURE: 75 MMHG | SYSTOLIC BLOOD PRESSURE: 113 MMHG

## 2019-06-17 VITALS — DIASTOLIC BLOOD PRESSURE: 60 MMHG | SYSTOLIC BLOOD PRESSURE: 117 MMHG

## 2019-06-17 VITALS — DIASTOLIC BLOOD PRESSURE: 63 MMHG | SYSTOLIC BLOOD PRESSURE: 130 MMHG

## 2019-06-17 VITALS — DIASTOLIC BLOOD PRESSURE: 63 MMHG | SYSTOLIC BLOOD PRESSURE: 131 MMHG

## 2019-06-17 VITALS — SYSTOLIC BLOOD PRESSURE: 113 MMHG | DIASTOLIC BLOOD PRESSURE: 72 MMHG

## 2019-06-17 VITALS — SYSTOLIC BLOOD PRESSURE: 114 MMHG | DIASTOLIC BLOOD PRESSURE: 58 MMHG

## 2019-06-17 VITALS — DIASTOLIC BLOOD PRESSURE: 55 MMHG | SYSTOLIC BLOOD PRESSURE: 99 MMHG

## 2019-06-17 LAB
APTT BLD: 52 SEC (ref 24–35)
BASOPHILS # BLD AUTO: 0 10^3/UL (ref 0–0.1)
BASOPHILS NFR BLD AUTO: 1 % (ref 0–10)
BASOPHILS NFR BLD MANUAL: 0 %
BUN/CREAT SERPL: 17
CALCIUM SERPL-MCNC: 8 MG/DL (ref 8.5–10.1)
CHLORIDE SERPL-SCNC: 106 MMOL/L (ref 98–107)
CHOLEST SERPL-MCNC: 180 MG/DL (ref ?–200)
CO2 SERPL-SCNC: 20 MMOL/L (ref 21–32)
CREAT SERPL-MCNC: 0.69 MG/DL (ref 0.6–1.3)
D DIMER PPP FEU-MCNC: 10.49 UG/ML (ref 0–0.49)
EOSINOPHIL # BLD AUTO: 0 10^3/UL (ref 0–0.3)
EOSINOPHIL NFR BLD AUTO: 0 % (ref 0–10)
EOSINOPHIL NFR BLD MANUAL: 0 %
ERYTHROCYTE [DISTWIDTH] IN BLOOD BY AUTOMATED COUNT: 13.4 % (ref 10–14.5)
FIBRINOGEN PPP-MCNC: 329 MG/DL (ref 221–496)
GFR SERPLBLD BASED ON 1.73 SQ M-ARVRAT: > 60 ML/MIN
GLUCOSE SERPL-MCNC: 98 MG/DL (ref 70–105)
HCT VFR BLD CALC: 31 % (ref 35–52)
HDLC SERPL-MCNC: 23 MG/DL (ref 40–60)
HGB BLD-MCNC: 10.4 G/DL (ref 11.5–16)
INR PPP: 1.1 (ref 0.8–1.4)
LYMPHOCYTES # BLD AUTO: 0.3 X 10^3 (ref 1–4)
LYMPHOCYTES NFR BLD AUTO: 20 % (ref 12–44)
MAGNESIUM SERPL-MCNC: 1.4 MG/DL (ref 1.8–2.4)
MCH RBC QN AUTO: 30 PG (ref 25–34)
MCHC RBC AUTO-ENTMCNC: 34 G/DL (ref 32–36)
MCV RBC AUTO: 90 FL (ref 80–99)
MONOCYTES # BLD AUTO: 0.1 X 10^3 (ref 0–1)
MONOCYTES NFR BLD AUTO: 8 % (ref 0–12)
MONOCYTES NFR BLD: 2 %
NEUTROPHILS # BLD AUTO: 0.9 X 10^3 (ref 1.8–7.8)
NEUTROPHILS NFR BLD AUTO: 72 % (ref 42–75)
NEUTS BAND NFR BLD MANUAL: 54 %
NEUTS BAND NFR BLD: 32 %
PHOSPHATE SERPL-MCNC: 3.5 MG/DL (ref 2.3–4.7)
PLATELET # BLD: 76 10^3/UL (ref 130–400)
PMV BLD AUTO: 10.5 FL (ref 7.4–10.4)
POTASSIUM SERPL-SCNC: 3.2 MMOL/L (ref 3.6–5)
PROTHROMBIN TIME: 14.8 SEC (ref 12.2–14.7)
RBC MORPH BLD: NORMAL
RETICS #: 25 10E9/L (ref 24–90)
RETICS/RBC NFR: 0.73 % (ref 0.5–2.4)
SODIUM SERPL-SCNC: 138 MMOL/L (ref 135–145)
TRIGL SERPL-MCNC: 132 MG/DL (ref ?–150)
VARIANT LYMPHS NFR BLD MANUAL: 12 %
VLDLC SERPL CALC-MCNC: 26 MG/DL (ref 5–40)
WBC # BLD AUTO: 1.3 10^3/UL (ref 4.3–11)

## 2019-06-17 RX ADMIN — DOXYCYCLINE HYCLATE SCH MG: 100 TABLET, COATED ORAL at 16:14

## 2019-06-17 RX ADMIN — PANTOPRAZOLE SODIUM SCH MG: 40 INJECTION, POWDER, FOR SOLUTION INTRAVENOUS at 09:55

## 2019-06-17 RX ADMIN — APIXABAN SCH MG: 5 TABLET, FILM COATED ORAL at 21:27

## 2019-06-17 RX ADMIN — MAGNESIUM SULFATE IN DEXTROSE SCH MLS/HR: 10 INJECTION, SOLUTION INTRAVENOUS at 09:55

## 2019-06-17 RX ADMIN — MAGNESIUM SULFATE IN DEXTROSE SCH MLS/HR: 10 INJECTION, SOLUTION INTRAVENOUS at 09:50

## 2019-06-17 RX ADMIN — ONDANSETRON PRN MG: 2 INJECTION, SOLUTION INTRAMUSCULAR; INTRAVENOUS at 12:23

## 2019-06-17 RX ADMIN — APIXABAN SCH MG: 5 TABLET, FILM COATED ORAL at 08:12

## 2019-06-17 RX ADMIN — POTASSIUM CHLORIDE SCH MLS/HR: 200 INJECTION, SOLUTION INTRAVENOUS at 09:50

## 2019-06-17 RX ADMIN — SODIUM CHLORIDE SCH MLS/HR: 900 INJECTION, SOLUTION INTRAVENOUS at 16:12

## 2019-06-17 RX ADMIN — MAGNESIUM SULFATE IN DEXTROSE SCH MLS/HR: 10 INJECTION, SOLUTION INTRAVENOUS at 11:49

## 2019-06-17 RX ADMIN — DOXYCYCLINE HYCLATE SCH MG: 100 TABLET, COATED ORAL at 06:22

## 2019-06-17 RX ADMIN — SODIUM CHLORIDE SCH MLS/HR: 900 INJECTION, SOLUTION INTRAVENOUS at 16:11

## 2019-06-17 RX ADMIN — SODIUM CHLORIDE SCH MLS/HR: 900 INJECTION, SOLUTION INTRAVENOUS at 09:55

## 2019-06-17 RX ADMIN — POTASSIUM CHLORIDE SCH MLS/HR: 200 INJECTION, SOLUTION INTRAVENOUS at 09:49

## 2019-06-17 RX ADMIN — MAGNESIUM SULFATE IN DEXTROSE SCH MLS/HR: 10 INJECTION, SOLUTION INTRAVENOUS at 11:50

## 2019-06-17 RX ADMIN — SODIUM CHLORIDE SCH MLS/HR: 900 INJECTION, SOLUTION INTRAVENOUS at 04:03

## 2019-06-17 RX ADMIN — MAGNESIUM SULFATE IN DEXTROSE SCH MLS/HR: 10 INJECTION, SOLUTION INTRAVENOUS at 09:51

## 2019-06-17 NOTE — NUR
SPOKE WITH THE PATIENT ABOUT HER MEDICATIONS. I WENT OVER THE EXT MED HX AND SHE VERIFIED 
HOW SHE TAKES THEM.  SHE TAKES ASPIRIN 81MG DAILY OTC.

## 2019-06-17 NOTE — PROGRESS NOTE-CARDIOLOGY
Cardiology SOAP Progress Note


Objective:


I&O/Vital Signs











 19





 23:40 01:00 03:46 07:00


 


Temp 99.0  97.9 


 


Pulse 84 88 91 87


 


Resp 18  18 


 


B/P (MAP) 131/63 (85)  127/61 (83) 


 


Pulse Ox 94  96 


 


O2 Delivery Nasal Cannula  Nasal Cannula 


 


O2 Flow Rate 1.00  1.00 


 


    





 19   





 08:48   


 


Temp 99.6   


 


Pulse 91   


 


Resp 18   


 


B/P (MAP) 131/63 (85)   


 


Pulse Ox 93   


 


O2 Delivery Room Air   














 19





 00:00


 


Intake Total 2260 ml


 


Balance 2260 ml








Weight (Pounds):  198


Weight (Ounces):  0.0


Weight (Calculated Kilograms):  89.828245


Constitutional:  AAO x 3, well-developed, well-nourished


Respiratory:  No accessory muscle use; other (Fair bilat air entry, diminished 

at the bases)


Cardiovascular:  regular rate-rhythm, S1 and S2, systolic murmur (faint ABBY at 

card base)


Gastrointestional:  No tender; soft; No guarding, No rebound; audible bowel 

sounds


Extremities:  No clubbing, No cyanosis, No significant edema


Neurologic/Psychiatric:  oriented x 3, other (able to move all limbs equally)


Skin:  No rash on exposed areas, No ulcerations on exposed areas





Results/Procedures:


Labs


Laboratory Tests


19 03:35: 


White Blood Count 3.9L, Red Blood Count 3.78L, Hemoglobin 11.3L, Hematocrit 34L,

Mean Corpuscular Volume 90, Mean Corpuscular Hemoglobin 30, Mean Corpuscular 

Hemoglobin Concent 33, Red Cell Distribution Width 13.9, Platelet Count 89L, 

Mean Platelet Volume 10.9H, Neutrophils (%) (Auto) 36L, Lymphocytes (%) (Auto) 

44, Monocytes (%) (Auto) 17H, Eosinophils (%) (Auto) 0, Basophils (%) (Auto) 3, 

Neutrophils # (Auto) 1.4L, Lymphocytes # (Auto) 1.7, Monocytes # (Auto) 0.7, 

Eosinophils # (Auto) 0.0, Basophils # (Auto) 0.1, Sodium Level 136, Potassium 

Level 3.8, Chloride Level 105, Carbon Dioxide Level 20L, Anion Gap 11, Blood 

Urea Nitrogen 10, Creatinine 0.68, Estimat Glomerular Filtration Rate > 60, 

BUN/Creatinine Ratio 15, Glucose Level 105, Calcium Level 8.3L, Phosphorus Level

2.4, Magnesium Level 1.6L





Microbiology


19 Blood Culture - Preliminary, Resulted


          No growth





Procedures


NAME:      JAMIN HUBBARD


MED REC#:   [id number]


ACCOUNT#:   [id number]


PT STATUS:   ADM IN


:      1945


PHYSICIAN:    BUCK GUNN DO


ADMIT DATE:   19/ICU


***Signed***


Date of Exam:   19





CHEST 1 VIEW, AP/PA ONLY


 





INDICATION: Dyspnea





COMPARISON: 2019





FINDINGS: Single frontal view of the chest demonstrates normal


heart size and pulmonary vascularity. The lungs are well aerated


and clear. No large pleural effusion or pneumothorax is seen. The


visualized osseous structures show no acute abnormalities.





IMPRESSION: 


1. No acute cardiopulmonary process.  





Dictated by: 





  Dictated on workstation # [id number]





[id number]





Dict:      [id number]


Trans:      [id number]





Interpreted by:         DON CORONA MD


Electronically signed by:   DON CORONA MD 19 0839





A/P:


Assessment:





Bilateral pulmonary embolism





Fever, etiology undetermined





Recent tick bite on the back of the neck (2019)





Hypertension, by history





Worsening leucopenia and thrombocytopenia, being managed by the Med Svce





Mild hypokalemia, likely due to chronic use of HCTZ





Mild troponin elevation: type 2 MI (due to hypoxia due to PE)


Plan:





* Treat with apixaban


* Management of possible tick-related illness and leucopenia and 

  thrombocytopenia is with Dr Singer


* D/c ASA and beta-blocker


* Continue to hold diuretics


* Replenish K


* Monitor labs











SHA WALDEN           2019 08:50

## 2019-06-17 NOTE — PULMONARY CONSULTATION
History of Present Illness


History of Present Illness


Date of Consultation


6/17/19


 06:59


Time Seen by Provider:  06:59


Date of Admission





History of Present Illness


73yo with hx of LE DVT presented to ED secondary to worsening weakness, and SOB.

CT scan of chest shows acute PE. SHe was placed on anticoagulation and admitted 

to [facility]. I am consulted for pulmonary management. PT is also being treated for 

tick borne infection.





Allergies and Home Medications


Allergies


Coded Allergies:  


     NKANo Known Allergies (Unverified  Allergy, Mild, 6/1/09)





Home Medications


Aspirin 81 Mg Tablet.dr, 81 MG PO DAILY, (Reported)


Celecoxib 200 Mg Capsule, 200 MG PO DAILY, (Reported)


Esomeprazole Magnesium 40 Mg Capsule.dr, 40 MG PO DAILY, (Reported)


Gabapentin 600 Mg Tablet, 600 MG PO HS, (Reported)


Hydrochlorothiazide 25 Mg Tablet, 25 MG PO DAILY, (Reported)


Lisinopril 20 Mg Tablet, 20 MG PO DAILY, (Reported)





Past Medical-Social-Family Hx


Past Med/Social Hx:  Reviewed Nursing Past Med/Soc Hx, Reviewed and Corrections 

made


Patient Social History


Alcohol Use:  Denies Use


Recreational Drug Use:  No


Smoking Status:  Never a Smoker


Recent Foreign Travel:  No


Contact w/Someone Who Travel:  No


Recent Infectious Disease Expo:  No


Physical Abuse:  No


Sexual Abuse:  No


Mistreated:  No


Fear:  No





Immunizations Up To Date


Date of Pneumonia Vaccine:  Nov 1, 2017


Date of Influenza Vaccine:  Sep 13, 2014





Seasonal Allergies


Seasonal Allergies:  No





Past Medical History


Surgeries:  Yes (LEFT WRIST ( FROM FX ))


Orthopedic


Respiratory:  No


Cardiac:  Yes (BLOOD CLOTS IN LEGS ABOUT 20 YRS AGO)


Deep Vein Thrombosis, Hypertension


Neurological:  Yes


Headaches /Migraines


Genitourinary:  No


Gastrointestinal:  Yes


Gastroesophageal Reflux


Musculoskeletal:  No


Arthritis, Chronic Back Pain


Endocrine:  No


HEENT:  No


Cancer:  No


Psychosocial:  No


Integumentary:  No


Blood Disorders:  No





Review of Systems


Time Seen by Provider:  11:50


Constitutional:  Fever, Chills, Sweats, Weakness, Malaise, Other


Eyes:  No: Pain, Vision change, Conjunctivae inflammation, Eyelid inflammation, 

Other, Redness


ENT:  Nose congestion; No: Ear pain, Ear discharge, Nose pain, Nose discharge, 

Mouth pain, Mouth swelling, Throat pain, Throat swelling, Other


Respiratory:  Cough, Dry, Shortness of breath, SOB with excertion; No: Wheezing,

Hemoptysis, Pleuritic Pain, Sputum, Wheezing, Other


Cardiovascular:  Orthopnea, Paroxysmal Noc. Dyspnea; No: Chest Pain, 

Palpitations, Edema, Lt Headedness, Other


Gastrointestinal:  Nausea, Vomiting; No: Abdominal Pain, Diarrhea, Constipation,

Melena, Hematochezia, Other


Genitourinary:  No Dysuria, No Frequency, No Incontinence, No Hematuria, No 

Retention, No Other


Neurological:  Weakness, Incoordination, Confusion





Sepsis Event


Evaluation


Height, Weight, BMI


Height: 5'6.00"


Weight: 198lbs. 0.0oz. 89.170606mb; 32.0 BMI


Method:Stated





Exam


Exam





Vital Signs








  Date Time  Temp Pulse Resp B/P (MAP) Pulse Ox O2 Delivery O2 Flow Rate FiO2


 


6/17/19 04:02 99.1 81 20 113/75 (88) 98 Nasal Cannula 2.00 


 


6/17/19 01:00  68      


 


6/17/19 00:23 98.0 76 18 99/55 (70) 95 Nasal Cannula 2.00 


 


6/17/19 00:00  76  99/54 (69)  Nasal Cannula 2.00 


 


6/16/19 23:50      Nasal Cannula 2.00 


 


6/16/19 21:45 98.0       


 


6/16/19 20:50 100.3       


 


6/16/19 20:41 100.3 90 18 103/60 (74) 94 Nasal Cannula 2.00 


 


6/16/19 20:00      Nasal Cannula 2.00 


 


6/16/19 19:00  99      


 


6/16/19 16:00 99.8 89 20 108/88 (95) 98 Nasal Cannula 2.00 


 


6/16/19 13:00  69      


 


6/16/19 12:00 98.8 88 20 106/78 (87) 95 Nasal Cannula 2.00 


 


6/16/19 10:46      Nasal Cannula 2.00 


 


6/16/19 10:13 99.7 92 20 115/61 94 Room Air 2.00 





       2.00 


 


6/16/19 10:00 98.0 87 20 125/87 (100) 98 Nasal Cannula 2.00 


 


6/16/19 09:59  85      


 


6/16/19 09:41 99.7 92 20 115/61 (79) 94 Room Air  


 


6/16/19 07:30     94 Nasal Cannula 2.00 


 


6/16/19 07:24 101.5 107 20 116/95 (746) 94   














I & O 


 


 6/17/19





 07:00


 


Intake Total 2900 ml


 


Output Total 550 ml


 


Balance 2350 ml








Height & Weight


Height: 5'6.00"


Weight: 198lbs. 0.0oz. 89.246764us; 32.0 BMI


Method:Stated


General Appearance:  No Apparent Distress, WD/WN, Chronically ill, Obese


HEENT:  PERRL/EOMI, Normal ENT Inspection, Pharynx Normal, Moist Mucous 

Membranes


Neck:  Full Range of Motion, Normal Inspection, Non Tender


Respiratory:  Chest Non Tender, Lungs Clear, Normal Breath Sounds, No Accessory 

Muscle Use, No Respiratory Distress


Cardiovascular:  Regular Rate, Rhythm, No Edema, No Gallop, No JVD, No Murmur, 

Normal Peripheral Pulses


Capillary Refill:  Less Than 3 Seconds


Gastrointestinal:  normal bowel sounds, non tender, soft


Extremity:  Normal Capillary Refill, Normal Inspection, Normal Range of Motion, 

Non Tender, No Calf Tenderness, No Pedal Edema


Neurologic/Psychiatric:  Alert, Oriented x3, No Motor/Sensory Deficits, Normal 

Mood/Affect


Skin:  Normal Color, Warm/Dry


Lymphatic:  No Adenopathy





Results


Lab


Laboratory Tests


6/16/19 07:17








6/17/19 03:33











Assessment/Plan


Assessment/Plan


Acute PE - With hx of DVTs in the past 


   -Currently on Eliquis 


   -Check bilateral dopplers and echocardiogram 


Pancytopenia


   -Check peripheral smear 


   -PT possibly needs BM BX 


   -consider consult oncology 


   -Check EBV, and west nile Ab


   -Pan cultures 


   -R/o tick dx 


   -Continue doxy for now and add Zosyn 


   -Add Protonix and check occult stool


Hepatitis 


   -Check hepatitis panel











BUCK GUNN DO              Jun 17, 2019 07:03

## 2019-06-17 NOTE — PROGRESS NOTE-CARDIOLOGY
Cardiology SOAP Progress Note


Subjective:


Balance better


No cp 


Shortness of breath better


Gen malaise and weakness persistent





Objective:


I&O/Vital Signs











 6/16/19 6/16/19 6/17/19 6/17/19





 21:45 23:50 00:00 00:23


 


Temp 98.0   98.0


 


Pulse   76 76


 


Resp    18


 


B/P (MAP)   99/54 (69) 99/55 (70)


 


Pulse Ox    95


 


O2 Delivery  Nasal Cannula Nasal Cannula Nasal Cannula


 


O2 Flow Rate  2.00 2.00 2.00


 


    





 6/17/19 6/17/19 6/17/19 6/17/19





 01:00 04:02 07:37 08:00


 


Temp  99.1  98.8


 


Pulse 68 81  89


 


Resp  20  18


 


B/P (MAP)  113/75 (88)  113/72 (86)


 


Pulse Ox  98  96


 


O2 Delivery  Nasal Cannula Nasal Cannula Nasal Cannula


 


O2 Flow Rate  2.00 2.00 2.00


 


    





 6/17/19   





 08:00   


 


O2 Delivery Nasal Cannula   


 


O2 Flow Rate 2.00   














 6/16/19





 23:59


 


Intake Total 500 ml


 


Output Total 350 ml


 


Balance 150 ml








Weight (Pounds):  198


Weight (Ounces):  0.0


Weight (Calculated Kilograms):  89.166228


Constitutional:  AAO x 3, well-developed, well-nourished


Respiratory:  No accessory muscle use; other (Fair bilat air entry, diminished 

at the bases)


Cardiovascular:  regular rate-rhythm, S1 and S2, systolic murmur (faint ABBY at 

card base)


Gastrointestional:  No tender; soft; No guarding, No rebound; audible bowel 

sounds


Extremities:  No clubbing, No cyanosis, No significant edema


Neurologic/Psychiatric:  oriented x 3, other (able to move all limbs equally)


Skin:  No rash on exposed areas, No ulcerations on exposed areas





Results/Procedures:


Labs


Laboratory Tests


6/16/19 11:55: Troponin I 0.066H


6/16/19 18:01: Troponin I 0.041H


6/16/19 23:57: Troponin I 0.029H


6/17/19 03:00: 


Prothrombin Time 14.8H, INR Comment 1.1, Activated Partial Thromboplast Time 52H

, Fibrinogen 329, D-Dimer 10.49H


6/17/19 03:30: Monoscreen NEGATIVE


6/17/19 03:33: 


White Blood Count 1.3*L, Red Blood Count 3.45L, Hemoglobin 10.4L, Hematocrit 31L

, Mean Corpuscular Volume 90, Mean Corpuscular Hemoglobin 30, Mean Corpuscular 

Hemoglobin Concent 34, Red Cell Distribution Width 13.4, Platelet Count 76L, 

Mean Platelet Volume 10.5H, Neutrophils (%) (Auto) 72, Lymphocytes (%) (Auto) 

20, Monocytes (%) (Auto) 8, Eosinophils (%) (Auto) 0, Basophils (%) (Auto) 1, 

Neutrophils # (Auto) 0.9L, Lymphocytes # (Auto) 0.3L, Monocytes # (Auto) 0.1, 

Eosinophils # (Auto) 0.0, Basophils # (Auto) 0.0, Neutrophils % (Manual) 54, 

Lymphocytes % (Manual) 12, Monocytes % (Manual) 2, Eosinophils % (Manual) 0, 

Basophils % (Manual) 0, Band Neutrophils 32, Blood Morphology Comment NORMAL, 

Absolute Reticulocyte Count 25, Percent Reticulocyte Count 0.73, Sodium Level 

138, Potassium Level 3.2L, Chloride Level 106, Carbon Dioxide Level 20L, Anion 

Gap 12, Blood Urea Nitrogen 12, Creatinine 0.69, Estimat Glomerular Filtration 

Rate > 60, BUN/Creatinine Ratio 17, Glucose Level 98, Calcium Level 8.0L, 

Phosphorus Level 3.5, Magnesium Level 1.4L, Triglycerides Level 132, Cholesterol

Level 180, LDL Cholesterol Direct 119, VLDL Cholesterol 26, HDL Cholesterol 23L


6/17/19 08:29: 





Laboratory Tests


6/16/19 07:17








6/17/19 03:33











A/P:


Assessment:





Bilateral pulmonary embolism





Worsening leucopenia and thrombocytopenia, consider DIC, evaluation and m

anagement is with the Hospitalist/Medical Svjovany





Fever, etiology undetermined





Recent tick bite on the back of the neck (June 2019)





Hypertension, by history





Worsening leucopenia and thrombocytopenia, being managed by the Med Svce





Mild hypokalemia, likely due to chronic use of HCTZ





Mild troponin elevation: type 2 MI (due to hypoxia due to PE)


Plan:





* Consider Heme/Onc consult


* Echo today


* Replenish K


* Monitor labs











ALYSSA VOSS MD FAC FAC CCDS   Jun 17, 2019 09:40

## 2019-06-17 NOTE — DIAGNOSTIC IMAGING REPORT
PROCEDURE: 

US Venous Lower Ext Chance.



TECHNIQUE: 

Multiple Real-time grayscale images were obtained over the lower

extremities in various projections bilaterally. Additional duplex

Doppler and color Doppler images were also obtained.



INDICATION: 

Bilateral pulmonary embolism.



EXAMINATIONS: 

Both grayscale and color Doppler imaging of the deep veins of the

lower extremities were performed with waveform analysis.



FINDINGS: 

There is no intraluminal filling defect. Normal continuous flow

is seen throughout the deep venous systems of both legs and there

is normal response to augmentation. The deep veins compress

normally. Note is made of superficial thrombophlebitis involving

the right greater saphenous vein which extends to the level of

the common femoral junction.



IMPRESSION: 

Thrombophlebitis involves the right greater saphenous vein

extending to the saphenofemoral junction.



No ultrasound evidence of deep venous thrombosis in either lower

extremity.







Dictated by: 



  Dictated on workstation # [id number]

## 2019-06-17 NOTE — PROGRESS NOTE (SOAP)
Subjective


Date Seen by a Provider:  Jun 17, 2019


Time Seen by a Provider:  12:35


Subjective/Events-last exam


Fwup bilateral PE, febrile illness with recent tick bite, pancytopenia, elevated

LFTs, Hypertension, Hypokalemia.  Patient resting in bed with oxygen on.  

Feeling better.





Focused Exam


Lactate Level


6/16/19 07:37: Lactic Acid Level 0.69





Objective


Exam





Vital Signs








  Date Time  Temp Pulse Resp B/P (MAP) Pulse Ox O2 Delivery O2 Flow Rate FiO2


 


6/17/19 19:35 99.2 90 18 130/63 (85) 95 Nasal Cannula 1.00 


 


6/17/19 19:00  88      


 


6/17/19 15:20 99.4 83 20 117/60 (79) 95 Nasal Cannula 2.00 


 


6/17/19 13:00  63      


 


6/17/19 12:00 98.2 88 18 114/58 (76) 97 Nasal Cannula 2.00 


 


6/17/19 08:00      Nasal Cannula 2.00 


 


6/17/19 08:00 98.8 89 18 113/72 (86) 96 Nasal Cannula 2.00 


 


6/17/19 07:37      Nasal Cannula 2.00 


 


6/17/19 07:00  75      


 


6/17/19 04:02 99.1 81 20 113/75 (88) 98 Nasal Cannula 2.00 


 


6/17/19 01:00  68      


 


6/17/19 00:23 98.0 76 18 99/55 (70) 95 Nasal Cannula 2.00 


 


6/17/19 00:00  76  99/54 (69)  Nasal Cannula 2.00 


 


6/16/19 23:50      Nasal Cannula 2.00 


 


6/16/19 21:45 98.0       


 


6/16/19 20:50 100.3       


 


6/16/19 20:41 100.3 90 18 103/60 (74) 94 Nasal Cannula 2.00 














I & O 


 


 6/17/19





 07:00


 


Intake Total 2900 ml


 


Output Total 550 ml


 


Balance 2350 ml





Capillary Refill : Less Than 3 Seconds


General Appearance:  No Apparent Distress


Neck:  Supple


Respiratory:  Lungs Clear


Cardiovascular:  Regular Rate, Rhythm


Gastrointestinal:  normal bowel sounds, non tender, soft


Extremity:  Non Tender, No Calf Tenderness, No Pedal Edema


Neurologic/Psychiatric:  Alert, Oriented x3


Skin:  Warm/Dry





Results


Lab


Laboratory Tests


6/16/19 23:57: Troponin I 0.029H


6/17/19 03:00: 


Prothrombin Time 14.8H, INR Comment 1.1, Activated Partial Thromboplast Time 52H

, Fibrinogen 329, D-Dimer 10.49H


6/17/19 03:30: Monoscreen NEGATIVE


6/17/19 03:33: 


White Blood Count 1.3*L, Red Blood Count 3.45L, Hemoglobin 10.4L, Hematocrit 31L

, Mean Corpuscular Volume 90, Mean Corpuscular Hemoglobin 30, Mean Corpuscular 

Hemoglobin Concent 34, Red Cell Distribution Width 13.4, Platelet Count 76L, 

Mean Platelet Volume 10.5H, Neutrophils (%) (Auto) 72, Lymphocytes (%) (Auto) 

20, Monocytes (%) (Auto) 8, Eosinophils (%) (Auto) 0, Basophils (%) (Auto) 1, 

Neutrophils # (Auto) 0.9L, Lymphocytes # (Auto) 0.3L, Monocytes # (Auto) 0.1, 

Eosinophils # (Auto) 0.0, Basophils # (Auto) 0.0, Neutrophils % (Manual) 54, 

Lymphocytes % (Manual) 12, Monocytes % (Manual) 2, Eosinophils % (Manual) 0, 

Basophils % (Manual) 0, Band Neutrophils 32, Blood Morphology Comment NORMAL, 

Absolute Reticulocyte Count 25, Percent Reticulocyte Count 0.73, Sodium Level 

138, Potassium Level 3.2L, Chloride Level 106, Carbon Dioxide Level 20L, Anion 

Gap 12, Blood Urea Nitrogen 12, Creatinine 0.69, Estimat Glomerular Filtration 

Rate > 60, BUN/Creatinine Ratio 17, Glucose Level 98, Calcium Level 8.0L, P

hosphorus Level 3.5, Magnesium Level 1.4L, Triglycerides Level 132, Cholesterol 

Level 180, LDL Cholesterol Direct 119, VLDL Cholesterol 26, HDL Cholesterol 23L


6/17/19 08:29: 





Microbiology


6/16/19 Blood Culture - Preliminary, Resulted


          No growth





Assessment/Plan


Assessment/Plan


Assess & Plan/Chief Complaint


1.  Acute Bilateral PE--on eliquis


2.  Febrile Illness with Recent Tick Bite--continue with doxycycline, tick 

titers pending as well as viral titers


3.  Pancytopenia and Elevated LFTs--most likely due to infection


4.  Hypertension--BP currently stable





Clinical Quality Measures


DVT/VTE Risk/Contraindication:


Risk Factor Score Per Nursing:  3


RFS Level Per Nursing on Admit:  3=High











MEL BUCK DO        Jun 17, 2019 20:18

## 2019-06-17 NOTE — DIAGNOSTIC IMAGING REPORT
INDICATION: Dyspnea



COMPARISON: 06/16/2019



FINDINGS: Single frontal view of the chest demonstrates normal

heart size and pulmonary vascularity. The lungs are well aerated

and clear. No large pleural effusion or pneumothorax is seen. The

visualized osseous structures show no acute abnormalities.



IMPRESSION: 

1. No acute cardiopulmonary process.  



Dictated by: 



  Dictated on workstation # [id number]

## 2019-06-17 NOTE — NUR
PT TRANSFERRED TO ROOM 426 VIA WC ACCOMPANIED BY THIS RN AND PT . PT PERSONAL 
BELONGINGS SENT TO NEW ROOM WITH PT. REPORT GIVEN TO ORI MORROW FOR CONTINUING CARE.

## 2019-06-18 VITALS — DIASTOLIC BLOOD PRESSURE: 73 MMHG | SYSTOLIC BLOOD PRESSURE: 124 MMHG

## 2019-06-18 VITALS — SYSTOLIC BLOOD PRESSURE: 131 MMHG | DIASTOLIC BLOOD PRESSURE: 63 MMHG

## 2019-06-18 VITALS — SYSTOLIC BLOOD PRESSURE: 123 MMHG | DIASTOLIC BLOOD PRESSURE: 74 MMHG

## 2019-06-18 VITALS — SYSTOLIC BLOOD PRESSURE: 127 MMHG | DIASTOLIC BLOOD PRESSURE: 61 MMHG

## 2019-06-18 VITALS — DIASTOLIC BLOOD PRESSURE: 65 MMHG | SYSTOLIC BLOOD PRESSURE: 117 MMHG

## 2019-06-18 VITALS — DIASTOLIC BLOOD PRESSURE: 73 MMHG | SYSTOLIC BLOOD PRESSURE: 127 MMHG

## 2019-06-18 LAB
BASOPHILS # BLD AUTO: 0.1 10^3/UL (ref 0–0.1)
BASOPHILS NFR BLD AUTO: 3 % (ref 0–10)
BUN/CREAT SERPL: 15
CALCIUM SERPL-MCNC: 8.3 MG/DL (ref 8.5–10.1)
CHLORIDE SERPL-SCNC: 105 MMOL/L (ref 98–107)
CO2 SERPL-SCNC: 20 MMOL/L (ref 21–32)
CREAT SERPL-MCNC: 0.68 MG/DL (ref 0.6–1.3)
EOSINOPHIL # BLD AUTO: 0 10^3/UL (ref 0–0.3)
EOSINOPHIL NFR BLD AUTO: 0 % (ref 0–10)
ERYTHROCYTE [DISTWIDTH] IN BLOOD BY AUTOMATED COUNT: 13.9 % (ref 10–14.5)
GFR SERPLBLD BASED ON 1.73 SQ M-ARVRAT: > 60 ML/MIN
GLUCOSE SERPL-MCNC: 105 MG/DL (ref 70–105)
HCT VFR BLD CALC: 34 % (ref 35–52)
HGB BLD-MCNC: 11.3 G/DL (ref 11.5–16)
LYMPHOCYTES # BLD AUTO: 1.7 X 10^3 (ref 1–4)
LYMPHOCYTES NFR BLD AUTO: 44 % (ref 12–44)
MAGNESIUM SERPL-MCNC: 1.6 MG/DL (ref 1.8–2.4)
MANUAL DIFFERENTIAL PERFORMED BLD QL: NO
MCH RBC QN AUTO: 30 PG (ref 25–34)
MCHC RBC AUTO-ENTMCNC: 33 G/DL (ref 32–36)
MCV RBC AUTO: 90 FL (ref 80–99)
MONOCYTES # BLD AUTO: 0.7 X 10^3 (ref 0–1)
MONOCYTES NFR BLD AUTO: 17 % (ref 0–12)
NEUTROPHILS # BLD AUTO: 1.4 X 10^3 (ref 1.8–7.8)
NEUTROPHILS NFR BLD AUTO: 36 % (ref 42–75)
PHOSPHATE SERPL-MCNC: 2.4 MG/DL (ref 2.3–4.7)
PLATELET # BLD: 89 10^3/UL (ref 130–400)
PMV BLD AUTO: 10.9 FL (ref 7.4–10.4)
POTASSIUM SERPL-SCNC: 3.8 MMOL/L (ref 3.6–5)
SODIUM SERPL-SCNC: 136 MMOL/L (ref 135–145)
WBC # BLD AUTO: 3.9 10^3/UL (ref 4.3–11)

## 2019-06-18 RX ADMIN — PANTOPRAZOLE SODIUM SCH MG: 40 INJECTION, POWDER, FOR SOLUTION INTRAVENOUS at 09:10

## 2019-06-18 RX ADMIN — SODIUM CHLORIDE SCH MLS/HR: 900 INJECTION, SOLUTION INTRAVENOUS at 07:40

## 2019-06-18 RX ADMIN — DOXYCYCLINE HYCLATE SCH MG: 100 TABLET, COATED ORAL at 16:21

## 2019-06-18 RX ADMIN — MAGNESIUM SULFATE IN DEXTROSE SCH MLS/HR: 10 INJECTION, SOLUTION INTRAVENOUS at 07:40

## 2019-06-18 RX ADMIN — APIXABAN SCH MG: 5 TABLET, FILM COATED ORAL at 09:11

## 2019-06-18 RX ADMIN — SODIUM CHLORIDE SCH MLS/HR: 900 INJECTION, SOLUTION INTRAVENOUS at 00:05

## 2019-06-18 RX ADMIN — APIXABAN SCH MG: 5 TABLET, FILM COATED ORAL at 20:11

## 2019-06-18 RX ADMIN — DOXYCYCLINE HYCLATE SCH MG: 100 TABLET, COATED ORAL at 05:50

## 2019-06-18 RX ADMIN — MAGNESIUM SULFATE IN DEXTROSE SCH MLS/HR: 10 INJECTION, SOLUTION INTRAVENOUS at 06:37

## 2019-06-18 RX ADMIN — SODIUM CHLORIDE SCH MLS/HR: 900 INJECTION, SOLUTION INTRAVENOUS at 16:21

## 2019-06-18 NOTE — PULMONARY PROGRESS NOTE
Subjective


Time Seen by a Provider:  06:15


Subjective/Events-last exam


NO complications noted.





Sepsis Event


Evaluation


Height, Weight, BMI


Height: 5'6.00"


Weight: 198lbs. 0.0oz. 89.665946md; 32.0 BMI


Method:Stated





Focused Exam


Lactate Level


6/16/19 07:37: Lactic Acid Level 0.69





Exam


Exam





Vital Signs








  Date Time  Temp Pulse Resp B/P (MAP) Pulse Ox O2 Delivery O2 Flow Rate FiO2


 


6/18/19 03:46 97.9 91 18 127/61 (83) 96 Nasal Cannula 1.00 


 


6/18/19 01:00  88      


 


6/17/19 23:40 99.0 84 18 131/63 (85) 94 Nasal Cannula 1.00 


 


6/17/19 19:35 99.2 90 18 130/63 (85) 95 Nasal Cannula 1.00 


 


6/17/19 19:00  88      


 


6/17/19 15:20 99.4 83 20 117/60 (79) 95 Nasal Cannula 2.00 


 


6/17/19 13:00  63      


 


6/17/19 12:00 98.2 88 18 114/58 (76) 97 Nasal Cannula 2.00 


 


6/17/19 08:00      Nasal Cannula 2.00 


 


6/17/19 08:00 98.8 89 18 113/72 (86) 96 Nasal Cannula 2.00 


 


6/17/19 07:37      Nasal Cannula 2.00 


 


6/17/19 07:00  75      














I & O 


 


 6/18/19





 07:00


 


Intake Total 3100 ml


 


Balance 3100 ml








Height & Weight


Height: 5'6.00"


Weight: 198lbs. 0.0oz. 89.970394pe; 32.0 BMI


Method:Stated


General Appearance:  No Apparent Distress


HEENT:  PERRL/EOMI, Normal ENT Inspection, Pharynx Normal, Moist Mucous Me

mbranes


Neck:  Supple


Respiratory:  Lungs Clear


Cardiovascular:  Regular Rate, Rhythm


Capillary Refill:  Less Than 3 Seconds


Gastrointestinal:  normal bowel sounds, non tender, soft


Extremity:  Non Tender, No Calf Tenderness, No Pedal Edema


Neurologic/Psychiatric:  Alert, Oriented x3


Skin:  Warm/Dry


Lymphatic:  No Adenopathy





Results


Lab


Laboratory Tests


6/16/19 07:17








6/17/19 03:33








6/18/19 03:35











Assessment/Plan


Assessment/Plan


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 


   -Currently on Eliquis 


   -Check bilateral dopplers and echocardiogram 


   -Since this is her second occurrence strong consideration for life long 

anticoagulation. 


Pancytopenia


   -Check EBV, and [place] n[place] Ab


   -Pan cultures 


   -R/o tick dx 


   -Continue doxy for now and add Zosyn 


Hepatitis 


   -hepatitis panel- neg


Hypomag 


   -replace











BUCK GUNN DO              Jun 18, 2019 06:17

## 2019-06-18 NOTE — PROGRESS NOTE (SOAP)
Subjective


Date Seen by a Provider:  Jun 18, 2019


Time Seen by a Provider:  12:30


Subjective/Events-last exam


Fwup bilateral PE, febrile illness with recent tick bite, pancytopenia, elevated

LFTs, Hypertension, Hypokalemia.  Patient resting in bed with oxygen off.  

Feeling better except legs sore.





Focused Exam


Lactate Level


6/16/19 07:37: Lactic Acid Level 0.69





Objective


Exam





Vital Signs








  Date Time  Temp Pulse Resp B/P (MAP) Pulse Ox O2 Delivery O2 Flow Rate FiO2


 


6/18/19 15:35 98.5 83 20 123/74 (90) 91 Room Air  


 


6/18/19 13:00  93      


 


6/18/19 11:55 98.8 92 18 117/65 (82) 95 Room Air  


 


6/18/19 10:00     93 Room Air 1.00 


 


6/18/19 08:48 99.6 91 18 131/63 (85) 93 Room Air  


 


6/18/19 07:00  87      


 


6/18/19 03:46 97.9 91 18 127/61 (83) 96 Nasal Cannula 1.00 


 


6/18/19 01:00  88      


 


6/17/19 23:40 99.0 84 18 131/63 (85) 94 Nasal Cannula 1.00 


 


6/17/19 19:35 99.2 90 18 130/63 (85) 95 Nasal Cannula 1.00 


 


6/17/19 19:00  88      














I & O 


 


 6/18/19





 07:00


 


Intake Total 3100 ml


 


Balance 3100 ml





Capillary Refill : Less Than 3 Seconds


General Appearance:  No Apparent Distress


Neck:  Supple


Respiratory:  Lungs Clear


Cardiovascular:  Regular Rate, Rhythm


Gastrointestinal:  normal bowel sounds, non tender, soft


Extremity:  Calf Tenderness, Pedal Edema (nonpitting)


Neurologic/Psychiatric:  Alert, Oriented x3


Skin:  Warm/Dry





Results


Lab


Laboratory Tests


6/18/19 03:35: 


White Blood Count 3.9L, Red Blood Count 3.78L, Hemoglobin 11.3L, Hematocrit 34L,

Mean Corpuscular Volume 90, Mean Corpuscular Hemoglobin 30, Mean Corpuscular 

Hemoglobin Concent 33, Red Cell Distribution Width 13.9, Platelet Count 89L, 

Mean Platelet Volume 10.9H, Neutrophils (%) (Auto) 36L, Lymphocytes (%) (Auto) 

44, Monocytes (%) (Auto) 17H, Eosinophils (%) (Auto) 0, Basophils (%) (Auto) 3, 

Neutrophils # (Auto) 1.4L, Lymphocytes # (Auto) 1.7, Monocytes # (Auto) 0.7, 

Eosinophils # (Auto) 0.0, Basophils # (Auto) 0.1, Sodium Level 136, Potassium 

Level 3.8, Chloride Level 105, Carbon Dioxide Level 20L, Anion Gap 11, Blood 

Urea Nitrogen 10, Creatinine 0.68, Estimat Glomerular Filtration Rate > 60, 

BUN/Creatinine Ratio 15, Glucose Level 105, Calcium Level 8.3L, Phosphorus Level

2.4, Magnesium Level 1.6L





Microbiology


6/16/19 Blood Culture - Preliminary, Resulted


          No growth





Assessment/Plan


Assessment/Plan


Assess & Plan/Chief Complaint


1.  Acute Bilateral PE--on eliquis


2.  Febrile Illness with Recent Tick Bite--continue with doxycycline, tick 

titers pending as well as viral titers but clinically improving


3.  Pancytopenia and Elevated LFTs--most likely due to infection, counts 

improving today


4.  Hypertension--BP currently stable





Clinical Quality Measures


DVT/VTE Risk/Contraindication:


Risk Factor Score Per Nursing:  3


RFS Level Per Nursing on Admit:  3=High











MEL BUCK DO        Jun 18, 2019 17:06

## 2019-06-18 NOTE — PROGRESS NOTE-CARDIOLOGY
Cardiology SOAP Progress Note


Subjective:


Sitting up in bed.  States she is feeling better today.  No c/o CP, 

palpitations, syncope or near syncope.  States she feels her breathing is better

today.





Objective:


I&O/Vital Signs











 6/18/19 6/18/19 6/18/19 6/18/19





 08:48 10:00 11:55 13:00


 


Temp 99.6  98.8 


 


Pulse 91  92 93


 


Resp 18  18 


 


B/P (MAP) 131/63 (85)  117/65 (82) 


 


Pulse Ox 93 93 95 


 


O2 Delivery Room Air Room Air Room Air 


 


O2 Flow Rate  1.00  


 


    





 6/18/19 6/18/19 6/18/19 





 15:35 19:00 19:25 


 


Temp 98.5   


 


Pulse 83 87  


 


Resp 20   


 


B/P (MAP) 123/74 (90)   


 


Pulse Ox 91   


 


O2 Delivery Room Air  Room Air 














 6/17/19





 23:59


 


Intake Total 2260 ml


 


Balance 2260 ml








Weight (Pounds):  198


Weight (Ounces):  0.0


Weight (Calculated Kilograms):  89.883049


Constitutional:  AAO x 3, well-developed, well-nourished


Respiratory:  No accessory muscle use; other (Fair bilat air entry, diminished 

at the bases)


Cardiovascular:  regular rate-rhythm, S1 and S2, systolic murmur (faint ABBY at 

card base)


Gastrointestional:  No tender; soft; No guarding, No rebound; audible bowel 

sounds


Extremities:  No clubbing, No cyanosis, No significant edema


Neurologic/Psychiatric:  oriented x 3, other (able to move all limbs equally)


Skin:  No rash on exposed areas, No ulcerations on exposed areas





Results/Procedures:


Labs


Laboratory Tests


6/18/19 03:35: 


White Blood Count 3.9L, Red Blood Count 3.78L, Hemoglobin 11.3L, Hematocrit 34L,

Mean Corpuscular Volume 90, Mean Corpuscular Hemoglobin 30, Mean Corpuscular 

Hemoglobin Concent 33, Red Cell Distribution Width 13.9, Platelet Count 89L, 

Mean Platelet Volume 10.9H, Neutrophils (%) (Auto) 36L, Lymphocytes (%) (Auto) 

44, Monocytes (%) (Auto) 17H, Eosinophils (%) (Auto) 0, Basophils (%) (Auto) 3, 

Neutrophils # (Auto) 1.4L, Lymphocytes # (Auto) 1.7, Monocytes # (Auto) 0.7, 

Eosinophils # (Auto) 0.0, Basophils # (Auto) 0.1, Sodium Level 136, Potassium 

Level 3.8, Chloride Level 105, Carbon Dioxide Level 20L, Anion Gap 11, Blood U

abby Nitrogen 10, Creatinine 0.68, Estimat Glomerular Filtration Rate > 60, 

BUN/Creatinine Ratio 15, Glucose Level 105, Calcium Level 8.3L, Phosphorus Level

2.4, Magnesium Level 1.6L





Microbiology


6/16/19 Blood Culture - Preliminary, Resulted


          No growth





Laboratory Tests


6/17/19 03:33








6/18/19 03:35











A/P:


Assessment:





Bilateral pulmonary embolism





Echo of 6-17-19 showed LVEF 60-65%; AoV thickening consistent with sclerosis; 

RVSP 28 mmHg





Leucopenia and thrombocytopenia, consider DIC, evaluation and management is with

the Hospitalist/Medical Svce





Fever, etiology undetermined





Recent tick bite on the back of the neck (June 2019)





Hypertension, by history





Mild hypokalemia, likely due to chronic use of HCTZ - resolved





Mild troponin elevation: type 2 MI (due to hypoxia due to PE)


Plan:





* Consider Heme/Onc consult


* Monitor labs


* Replace electrolytes





Physician Assessment


Physician Assessment


No cp or palp or syncope. Shortness of breath better


Gen malaise better


Lung: good bilat air entry


Cor: reg


Ext: no c/c/e





A&R


* As documented in our note above that I updated (italics) and as noted below


* I discussed her CV issues with her and her spouse today











SHA WALDEN           Jun 18, 2019 11:01


ALYSSA VOSS MD FACP FAC CCDS   Jun 18, 2019 19:47

## 2019-06-19 VITALS — SYSTOLIC BLOOD PRESSURE: 151 MMHG | DIASTOLIC BLOOD PRESSURE: 91 MMHG

## 2019-06-19 VITALS — SYSTOLIC BLOOD PRESSURE: 149 MMHG | DIASTOLIC BLOOD PRESSURE: 73 MMHG

## 2019-06-19 VITALS — SYSTOLIC BLOOD PRESSURE: 145 MMHG | DIASTOLIC BLOOD PRESSURE: 82 MMHG

## 2019-06-19 VITALS — DIASTOLIC BLOOD PRESSURE: 91 MMHG | SYSTOLIC BLOOD PRESSURE: 151 MMHG

## 2019-06-19 VITALS — DIASTOLIC BLOOD PRESSURE: 79 MMHG | SYSTOLIC BLOOD PRESSURE: 151 MMHG

## 2019-06-19 LAB
BASOPHILS # BLD AUTO: 0.1 10^3/UL (ref 0–0.1)
BASOPHILS NFR BLD AUTO: 2 % (ref 0–10)
BUN/CREAT SERPL: 19
CALCIUM SERPL-MCNC: 8.2 MG/DL (ref 8.5–10.1)
CHLORIDE SERPL-SCNC: 106 MMOL/L (ref 98–107)
CO2 SERPL-SCNC: 25 MMOL/L (ref 21–32)
CREAT SERPL-MCNC: 0.63 MG/DL (ref 0.6–1.3)
EOSINOPHIL # BLD AUTO: 0 10^3/UL (ref 0–0.3)
EOSINOPHIL NFR BLD AUTO: 0 % (ref 0–10)
ERYTHROCYTE [DISTWIDTH] IN BLOOD BY AUTOMATED COUNT: 13.5 % (ref 10–14.5)
GFR SERPLBLD BASED ON 1.73 SQ M-ARVRAT: > 60 ML/MIN
GLUCOSE SERPL-MCNC: 96 MG/DL (ref 70–105)
HCT VFR BLD CALC: 30 % (ref 35–52)
HGB BLD-MCNC: 10 G/DL (ref 11.5–16)
LYMPHOCYTES # BLD AUTO: 3.8 X 10^3 (ref 1–4)
LYMPHOCYTES NFR BLD AUTO: 74 % (ref 12–44)
MAGNESIUM SERPL-MCNC: 1.7 MG/DL (ref 1.8–2.4)
MANUAL DIFFERENTIAL PERFORMED BLD QL: NO
MCH RBC QN AUTO: 30 PG (ref 25–34)
MCHC RBC AUTO-ENTMCNC: 33 G/DL (ref 32–36)
MCV RBC AUTO: 91 FL (ref 80–99)
MONOCYTES # BLD AUTO: 0.3 X 10^3 (ref 0–1)
MONOCYTES NFR BLD AUTO: 6 % (ref 0–12)
NEUTROPHILS # BLD AUTO: 1 X 10^3 (ref 1.8–7.8)
NEUTROPHILS NFR BLD AUTO: 19 % (ref 42–75)
PHOSPHATE SERPL-MCNC: 3.2 MG/DL (ref 2.3–4.7)
PLATELET # BLD: 88 10^3/UL (ref 130–400)
PMV BLD AUTO: 11.1 FL (ref 7.4–10.4)
POTASSIUM SERPL-SCNC: 3.4 MMOL/L (ref 3.6–5)
SODIUM SERPL-SCNC: 140 MMOL/L (ref 135–145)
WBC # BLD AUTO: 5.1 10^3/UL (ref 4.3–11)

## 2019-06-19 RX ADMIN — DOXYCYCLINE HYCLATE SCH MG: 100 TABLET, COATED ORAL at 17:04

## 2019-06-19 RX ADMIN — APIXABAN SCH MG: 5 TABLET, FILM COATED ORAL at 09:00

## 2019-06-19 RX ADMIN — PANTOPRAZOLE SODIUM SCH MG: 40 INJECTION, POWDER, FOR SOLUTION INTRAVENOUS at 09:00

## 2019-06-19 RX ADMIN — SODIUM CHLORIDE SCH MLS/HR: 900 INJECTION, SOLUTION INTRAVENOUS at 00:19

## 2019-06-19 RX ADMIN — DOXYCYCLINE HYCLATE SCH MG: 100 TABLET, COATED ORAL at 06:04

## 2019-06-19 RX ADMIN — SODIUM CHLORIDE SCH MLS/HR: 900 INJECTION, SOLUTION INTRAVENOUS at 08:15

## 2019-06-19 NOTE — NUR
Important Message from Medicare presented, reviewed, signed and placed in patient chart. 
Patient and her  voiced no intention to appeal and deny any needs or further 
questions at this time.

## 2019-06-19 NOTE — PULMONARY PROGRESS NOTE
Subjective


Time Seen by a Provider:  08:00





Sepsis Event


Evaluation


Height, Weight, BMI


Height: 5'6.00"


Weight: 198lbs. 0.0oz. 89.922560kt; 32.0 BMI


Method:Stated





Exam


Exam





Vital Signs








  Date Time  Temp Pulse Resp B/P (MAP) Pulse Ox O2 Delivery O2 Flow Rate FiO2


 


6/19/19 12:00 97.6 78 18 149/73 (98) 95 Nasal Cannula 2.00 


 


6/19/19 08:00 97.5 81 18 151/79 (103) 96 Nasal Cannula 1.00 


 


6/19/19 08:00     96 Nasal Cannula 1.00 


 


6/19/19 04:00 97.6 84 20 145/82 (103) 95 Nasal Cannula 1.00 


 


6/19/19 01:00  71      


 


6/18/19 23:40 98.2 89 20 124/73 (90) 92 Nasal Cannula 1.00 


 


6/18/19 20:12      Room Air  


 


6/18/19 20:10 98.3 86 20 127/73 (91) 94 Room Air  


 


6/18/19 19:25      Room Air  


 


6/18/19 19:00  87      














I & O 


 


 6/19/19





 07:00


 


Intake Total 1470 ml


 


Output Total 200 ml


 


Balance 1270 ml








Height & Weight


Height: 5'6.00"


Weight: 198lbs. 0.0oz. 89.872841lc; 32.0 BMI


Method:Stated


General Appearance:  No Apparent Distress


HEENT:  PERRL/EOMI, Normal ENT Inspection, Pharynx Normal, Moist Mucous 

Membranes


Neck:  Supple


Respiratory:  Lungs Clear


Cardiovascular:  Regular Rate, Rhythm


Capillary Refill:  Less Than 3 Seconds


Gastrointestinal:  normal bowel sounds, non tender, soft


Extremity:  Calf Tenderness, Pedal Edema (nonpitting)


Neurologic/Psychiatric:  Alert, Oriented x3


Skin:  Warm/Dry


Lymphatic:  No Adenopathy





Results


Lab


Laboratory Tests


6/18/19 03:35








6/19/19 05:44








6/19/19 05:54











Assessment/Plan


Assessment/Plan


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 


   -Currently on Eliquis 


   -Check bilateral dopplers and echocardiogram 


   -Since this is her second occurrence strong consideration for life long 

anticoagulation. 


Pancytopenia


   -Check EBV, and [place] Ab


   -Pan cultures 


   -R/o tick dx 


   -doxy   Zosyn 


Hepatitis 


   -hepatitis panel- neg











BUCK GUNN DO              Jun 19, 2019 15:49

## 2019-06-19 NOTE — NUR
[facility] DOWN FROM 0730 TILL 1150. ZOSYN WAS NOT GIVEN AT SCHEDULED TIME D/T UNABLE TO SEE 
WHEN LAST DOSE WAS GIVEN.

## 2019-06-19 NOTE — NUR
pt was 88 on room air at rest then walked droped to 82 oxygen added after 3 min sat was 93% 
and remained there for rest of test

-------------------------------------------------------------------------------

Addendum: 06/19/19 at 1551 by MEERA CHEN RT

-------------------------------------------------------------------------------

Amended: Links added.

## 2019-06-19 NOTE — NUR
PT STABLE AND READY FOR DISCHARGE PER DR. BUCK ORDERS. WRITTEN AND VERBAL D/C 
INSTRUCTIONS GONE OVER WITH PT AND . SCHEDULED F/U APPOINTMENT GIVEN. PT LEFT WITH 
TWO LITERS OF OXYGEN ON. ALL BELONGINGS SENT WITH PT. TAKEN VIA WHEELCHAIR TO PRIVATE 
VEHICLE BY NURIS RAI. NO FURTHER NEEDS IDENTIFIED.

## 2019-06-19 NOTE — PROGRESS NOTE-CARDIOLOGY
Cardiology SOAP Progress Note


Subjective:


Less weakness and malaise and shortness of breath


Better balance





Objective:


I&O/Vital Signs











 6/19/19 6/19/19





 01:00 04:00


 


Temp  97.6


 


Pulse 71 84


 


Resp  20


 


B/P (MAP)  145/82 (103)


 


Pulse Ox  95


 


O2 Delivery  Nasal Cannula


 


O2 Flow Rate  1.00














 6/19/19





 00:00


 


Intake Total 1030 ml


 


Balance 1030 ml








Weight (Pounds):  198


Weight (Ounces):  0.0


Weight (Calculated Kilograms):  89.885025


Constitutional:  AAO x 3, well-developed, well-nourished


Respiratory:  No accessory muscle use; other (Fair bilat air entry, diminished 

at the bases)


Cardiovascular:  regular rate-rhythm, S1 and S2, systolic murmur (faint ABBY at 

card base)


Gastrointestional:  No tender; soft; No guarding, No rebound; audible bowel 

sounds


Extremities:  No clubbing, No cyanosis, No significant edema


Neurologic/Psychiatric:  oriented x 3, other (able to move all limbs equally)


Skin:  No rash on exposed areas, No ulcerations on exposed areas





Results/Procedures:


Labs


Laboratory Tests


6/19/19 05:44: 


White Blood Count 5.1, Red Blood Count 3.32L, Hemoglobin 10.0L, Hematocrit 30L, 

Mean Corpuscular Volume 91, Mean Corpuscular Hemoglobin 30, Mean Corpuscular 

Hemoglobin Concent 33, Red Cell Distribution Width 13.5, Platelet Count 88L, 

Mean Platelet Volume 11.1H, Neutrophils (%) (Auto) 19L, Lymphocytes (%) (Auto) 

74H, Monocytes (%) (Auto) 6, Eosinophils (%) (Auto) 0, Basophils (%) (Auto) 2, 

Neutrophils # (Auto) 1.0L, Lymphocytes # (Auto) 3.8, Monocytes # (Auto) 0.3, 

Eosinophils # (Auto) 0.0, Basophils # (Auto) 0.1


6/19/19 05:54: 


Sodium Level 140, Potassium Level 3.4L, Chloride Level 106, Carbon Dioxide Level

25, Anion Gap 9, Blood Urea Nitrogen 12, Creatinine 0.63, Estimat Glomerular 

Filtration Rate > 60, BUN/Creatinine Ratio 19, Glucose Level 96, Calcium Level 

8.2L, Phosphorus Level 3.2, Magnesium Level 1.7L





Microbiology


6/16/19 Blood Culture - Preliminary, Resulted


          No growth





Laboratory Tests


6/18/19 03:35








6/19/19 05:44








6/19/19 05:54











A/P:


Assessment:





Bilateral pulmonary embolism





Echo of 6-17-19 showed LVEF 60-65%; AoV thickening consistent with sclerosis; 

RVSP 28 mmHg





Leucopenia and thrombocytopenia, consider DIC, evaluation and management is with

the Hospitalist/Medical Svce





Fever, etiology undetermined





Recent tick bite on the back of the neck (June 2019)





Hypertension, by history





Mild hypokalemia, likely due to chronic use of HCTZ 





Mild troponin elevation: type 2 MI (due to hypoxia due to PE)


Plan:





* Replenish K


* Monitor labs











ALYSSA VOSS MD FACP FAC CCDS   Jun 19, 2019 12:26

## 2019-06-19 NOTE — DISCHARGE INST-SIMPLE/STANDARD
Discharge Inst-Standard


Discharge Medications


New, Converted or Re-Newed RX:  Transmitted to Pharmacy





Patient Instructions/Follow Up


Plan of Care/Instructions/FU:  


Fwup with me in 1 week


Activity as Tolerated:  Yes


Discharge Diet:  Low Sodium Diet











MEL BUCK DO        Jun 19, 2019 12:53

## 2019-12-30 ENCOUNTER — HOSPITAL ENCOUNTER (OUTPATIENT)
Dept: HOSPITAL 75 - RAD | Age: 74
End: 2019-12-30
Attending: FAMILY MEDICINE
Payer: MEDICARE

## 2019-12-30 DIAGNOSIS — I82.491: Primary | ICD-10-CM

## 2019-12-30 NOTE — DIAGNOSTIC IMAGING REPORT
PROCEDURE: US right lower extremity venous.



TECHNIQUE: Multiple real-time grayscale images were obtained over

the right lower extremity in various projections. Additional

spectral analysis and color Doppler duplex images were also

obtained.



INDICATION: Right lower extremity DVT.



There is no evidence of right lower extremity DVT. Right lower

extremity deep venous system shows normal compressibility with

normal response to augmentation and Valsalva. Patient does have

some residual thrombus identified in the upper and mid aspect of

the greater saphenous vein. This was seen on ultrasound from

06/17/2019.



IMPRESSION: There continues to be thrombus in the right greater

saphenous vein in the upper and midportion. No deep venous

thrombosis is seen.



Dictated by: 



  Dictated on workstation # [id number]

## 2020-06-15 ENCOUNTER — HOSPITAL ENCOUNTER (OUTPATIENT)
Dept: HOSPITAL 75 - LABNPT | Age: 75
End: 2020-06-15
Attending: ORTHOPAEDIC SURGERY
Payer: MEDICARE

## 2020-06-15 DIAGNOSIS — Z20.828: Primary | ICD-10-CM

## 2020-06-15 PROCEDURE — 87635 SARS-COV-2 COVID-19 AMP PRB: CPT

## 2020-07-29 ENCOUNTER — HOSPITAL ENCOUNTER (OUTPATIENT)
Dept: HOSPITAL 75 - REHAB | Age: 75
LOS: 43 days | Discharge: HOME | End: 2020-09-10
Attending: ORTHOPAEDIC SURGERY
Payer: MEDICARE

## 2020-07-29 DIAGNOSIS — Z96.651: ICD-10-CM

## 2020-07-29 DIAGNOSIS — Z47.1: Primary | ICD-10-CM
